# Patient Record
Sex: MALE | Race: WHITE | NOT HISPANIC OR LATINO | Employment: OTHER | URBAN - METROPOLITAN AREA
[De-identification: names, ages, dates, MRNs, and addresses within clinical notes are randomized per-mention and may not be internally consistent; named-entity substitution may affect disease eponyms.]

---

## 2017-11-05 ENCOUNTER — RESOLUTE PROFESSIONAL BILLING HOSPITAL PROF FEE (OUTPATIENT)
Dept: HOSPITALIST | Facility: MEDICAL CENTER | Age: 82
End: 2017-11-05
Payer: COMMERCIAL

## 2017-11-05 ENCOUNTER — APPOINTMENT (OUTPATIENT)
Dept: RADIOLOGY | Facility: MEDICAL CENTER | Age: 82
DRG: 243 | End: 2017-11-05
Attending: EMERGENCY MEDICINE
Payer: COMMERCIAL

## 2017-11-05 ENCOUNTER — HOSPITAL ENCOUNTER (INPATIENT)
Facility: MEDICAL CENTER | Age: 82
LOS: 2 days | DRG: 243 | End: 2017-11-07
Attending: EMERGENCY MEDICINE | Admitting: INTERNAL MEDICINE
Payer: COMMERCIAL

## 2017-11-05 DIAGNOSIS — F03.91 DEMENTIA WITH BEHAVIORAL DISTURBANCE, UNSPECIFIED DEMENTIA TYPE: ICD-10-CM

## 2017-11-05 DIAGNOSIS — R55 SYNCOPE, UNSPECIFIED SYNCOPE TYPE: ICD-10-CM

## 2017-11-05 DIAGNOSIS — I95.9 HYPOTENSION, UNSPECIFIED HYPOTENSION TYPE: ICD-10-CM

## 2017-11-05 DIAGNOSIS — R00.1 BRADYCARDIA: ICD-10-CM

## 2017-11-05 PROBLEM — G30.9 ALZHEIMER'S DISEASE (HCC): Status: ACTIVE | Noted: 2017-11-05

## 2017-11-05 PROBLEM — N17.9 AKI (ACUTE KIDNEY INJURY) (HCC): Status: ACTIVE | Noted: 2017-11-05

## 2017-11-05 PROBLEM — F02.80 ALZHEIMER'S DISEASE (HCC): Status: ACTIVE | Noted: 2017-11-05

## 2017-11-05 PROBLEM — R79.89 ELEVATED BRAIN NATRIURETIC PEPTIDE (BNP) LEVEL: Status: ACTIVE | Noted: 2017-11-05

## 2017-11-05 LAB
ALBUMIN SERPL BCP-MCNC: 3.1 G/DL (ref 3.2–4.9)
ALBUMIN/GLOB SERPL: 1.1 G/DL
ALP SERPL-CCNC: 65 U/L (ref 30–99)
ALT SERPL-CCNC: 11 U/L (ref 2–50)
ANION GAP SERPL CALC-SCNC: 10 MMOL/L (ref 0–11.9)
APTT PPP: 43.7 SEC (ref 24.7–36)
AST SERPL-CCNC: 14 U/L (ref 12–45)
BASOPHILS # BLD AUTO: 0.6 % (ref 0–1.8)
BASOPHILS # BLD: 0.04 K/UL (ref 0–0.12)
BILIRUB SERPL-MCNC: 0.6 MG/DL (ref 0.1–1.5)
BNP SERPL-MCNC: 423 PG/ML (ref 0–100)
BUN SERPL-MCNC: 36 MG/DL (ref 8–22)
CALCIUM SERPL-MCNC: 8.2 MG/DL (ref 8.5–10.5)
CHLORIDE SERPL-SCNC: 104 MMOL/L (ref 96–112)
CO2 SERPL-SCNC: 24 MMOL/L (ref 20–33)
CREAT SERPL-MCNC: 1.61 MG/DL (ref 0.5–1.4)
EKG IMPRESSION: NORMAL
EOSINOPHIL # BLD AUTO: 0.21 K/UL (ref 0–0.51)
EOSINOPHIL NFR BLD: 3 % (ref 0–6.9)
ERYTHROCYTE [DISTWIDTH] IN BLOOD BY AUTOMATED COUNT: 48.1 FL (ref 35.9–50)
GFR SERPL CREATININE-BSD FRML MDRD: 40 ML/MIN/1.73 M 2
GLOBULIN SER CALC-MCNC: 2.8 G/DL (ref 1.9–3.5)
GLUCOSE SERPL-MCNC: 116 MG/DL (ref 65–99)
HCT VFR BLD AUTO: 36 % (ref 42–52)
HGB BLD-MCNC: 12.8 G/DL (ref 14–18)
IMM GRANULOCYTES # BLD AUTO: 0.04 K/UL (ref 0–0.11)
IMM GRANULOCYTES NFR BLD AUTO: 0.6 % (ref 0–0.9)
INR PPP: 2.8 (ref 0.87–1.13)
LYMPHOCYTES # BLD AUTO: 1.66 K/UL (ref 1–4.8)
LYMPHOCYTES NFR BLD: 23.6 % (ref 22–41)
MCH RBC QN AUTO: 32.7 PG (ref 27–33)
MCHC RBC AUTO-ENTMCNC: 35.6 G/DL (ref 33.7–35.3)
MCV RBC AUTO: 91.8 FL (ref 81.4–97.8)
MONOCYTES # BLD AUTO: 0.52 K/UL (ref 0–0.85)
MONOCYTES NFR BLD AUTO: 7.4 % (ref 0–13.4)
NEUTROPHILS # BLD AUTO: 4.56 K/UL (ref 1.82–7.42)
NEUTROPHILS NFR BLD: 64.8 % (ref 44–72)
NRBC # BLD AUTO: 0 K/UL
NRBC BLD AUTO-RTO: 0 /100 WBC
PLATELET # BLD AUTO: 187 K/UL (ref 164–446)
PMV BLD AUTO: 9.6 FL (ref 9–12.9)
POTASSIUM SERPL-SCNC: 3.4 MMOL/L (ref 3.6–5.5)
PROT SERPL-MCNC: 5.9 G/DL (ref 6–8.2)
PROTHROMBIN TIME: 29.2 SEC (ref 12–14.6)
RBC # BLD AUTO: 3.92 M/UL (ref 4.7–6.1)
SODIUM SERPL-SCNC: 138 MMOL/L (ref 135–145)
TROPONIN I SERPL-MCNC: 0.02 NG/ML (ref 0–0.04)
WBC # BLD AUTO: 7 K/UL (ref 4.8–10.8)

## 2017-11-05 PROCEDURE — 85730 THROMBOPLASTIN TIME PARTIAL: CPT

## 2017-11-05 PROCEDURE — 84484 ASSAY OF TROPONIN QUANT: CPT

## 2017-11-05 PROCEDURE — 700105 HCHG RX REV CODE 258: Performed by: INTERNAL MEDICINE

## 2017-11-05 PROCEDURE — 84439 ASSAY OF FREE THYROXINE: CPT

## 2017-11-05 PROCEDURE — A9270 NON-COVERED ITEM OR SERVICE: HCPCS | Performed by: INTERNAL MEDICINE

## 2017-11-05 PROCEDURE — 85025 COMPLETE CBC W/AUTO DIFF WBC: CPT

## 2017-11-05 PROCEDURE — 83880 ASSAY OF NATRIURETIC PEPTIDE: CPT

## 2017-11-05 PROCEDURE — 700102 HCHG RX REV CODE 250 W/ 637 OVERRIDE(OP): Performed by: INTERNAL MEDICINE

## 2017-11-05 PROCEDURE — 80053 COMPREHEN METABOLIC PANEL: CPT

## 2017-11-05 PROCEDURE — 99285 EMERGENCY DEPT VISIT HI MDM: CPT

## 2017-11-05 PROCEDURE — 70450 CT HEAD/BRAIN W/O DYE: CPT

## 2017-11-05 PROCEDURE — 85610 PROTHROMBIN TIME: CPT

## 2017-11-05 PROCEDURE — 770020 HCHG ROOM/CARE - TELE (206)

## 2017-11-05 PROCEDURE — 84443 ASSAY THYROID STIM HORMONE: CPT

## 2017-11-05 PROCEDURE — 71010 DX-CHEST-PORTABLE (1 VIEW): CPT

## 2017-11-05 PROCEDURE — 93005 ELECTROCARDIOGRAM TRACING: CPT | Performed by: EMERGENCY MEDICINE

## 2017-11-05 PROCEDURE — 99223 1ST HOSP IP/OBS HIGH 75: CPT | Performed by: INTERNAL MEDICINE

## 2017-11-05 PROCEDURE — 700105 HCHG RX REV CODE 258: Performed by: EMERGENCY MEDICINE

## 2017-11-05 RX ORDER — FUROSEMIDE 10 MG/ML
20 INJECTION INTRAMUSCULAR; INTRAVENOUS
Status: CANCELLED | OUTPATIENT
Start: 2017-11-05

## 2017-11-05 RX ORDER — POLYETHYLENE GLYCOL 3350 17 G/17G
1 POWDER, FOR SOLUTION ORAL
Status: DISCONTINUED | OUTPATIENT
Start: 2017-11-05 | End: 2017-11-07 | Stop reason: HOSPADM

## 2017-11-05 RX ORDER — ATORVASTATIN CALCIUM 20 MG/1
40 TABLET, FILM COATED ORAL NIGHTLY
COMMUNITY
End: 2017-11-08

## 2017-11-05 RX ORDER — WARFARIN SODIUM 5 MG/1
5 TABLET ORAL DAILY
Status: DISCONTINUED | OUTPATIENT
Start: 2017-11-06 | End: 2017-11-05

## 2017-11-05 RX ORDER — POTASSIUM CHLORIDE 20 MEQ/1
20 TABLET, EXTENDED RELEASE ORAL 2 TIMES DAILY
COMMUNITY

## 2017-11-05 RX ORDER — SODIUM CHLORIDE 9 MG/ML
1000 INJECTION, SOLUTION INTRAVENOUS ONCE
Status: COMPLETED | OUTPATIENT
Start: 2017-11-05 | End: 2017-11-05

## 2017-11-05 RX ORDER — CLONIDINE HYDROCHLORIDE 0.2 MG/1
0.2 TABLET ORAL 2 TIMES DAILY
Status: ON HOLD | COMMUNITY
End: 2017-11-07

## 2017-11-05 RX ORDER — SODIUM CHLORIDE 9 MG/ML
INJECTION, SOLUTION INTRAVENOUS CONTINUOUS
Status: DISCONTINUED | OUTPATIENT
Start: 2017-11-05 | End: 2017-11-07 | Stop reason: HOSPADM

## 2017-11-05 RX ORDER — ACETAMINOPHEN 325 MG/1
650 TABLET ORAL EVERY 6 HOURS PRN
Status: DISCONTINUED | OUTPATIENT
Start: 2017-11-05 | End: 2017-11-07 | Stop reason: HOSPADM

## 2017-11-05 RX ORDER — WARFARIN SODIUM 5 MG/1
5 TABLET ORAL EVERY EVENING
COMMUNITY

## 2017-11-05 RX ORDER — CHLORTHALIDONE 25 MG/1
25 TABLET ORAL DAILY
COMMUNITY

## 2017-11-05 RX ORDER — ONDANSETRON 2 MG/ML
4 INJECTION INTRAMUSCULAR; INTRAVENOUS EVERY 4 HOURS PRN
Status: DISCONTINUED | OUTPATIENT
Start: 2017-11-05 | End: 2017-11-07 | Stop reason: HOSPADM

## 2017-11-05 RX ORDER — WARFARIN SODIUM 2.5 MG/1
2.5 TABLET ORAL
Status: COMPLETED | OUTPATIENT
Start: 2017-11-06 | End: 2017-11-06

## 2017-11-05 RX ORDER — MELOXICAM 7.5 MG/1
7.5 TABLET ORAL EVERY EVENING
COMMUNITY

## 2017-11-05 RX ORDER — BISACODYL 10 MG
10 SUPPOSITORY, RECTAL RECTAL
Status: DISCONTINUED | OUTPATIENT
Start: 2017-11-05 | End: 2017-11-07 | Stop reason: HOSPADM

## 2017-11-05 RX ORDER — ATORVASTATIN CALCIUM 40 MG/1
40 TABLET, FILM COATED ORAL NIGHTLY
Status: DISCONTINUED | OUTPATIENT
Start: 2017-11-05 | End: 2017-11-07 | Stop reason: HOSPADM

## 2017-11-05 RX ORDER — AMOXICILLIN 250 MG
2 CAPSULE ORAL 2 TIMES DAILY
Status: DISCONTINUED | OUTPATIENT
Start: 2017-11-05 | End: 2017-11-07 | Stop reason: HOSPADM

## 2017-11-05 RX ORDER — ONDANSETRON 4 MG/1
4 TABLET, ORALLY DISINTEGRATING ORAL EVERY 4 HOURS PRN
Status: DISCONTINUED | OUTPATIENT
Start: 2017-11-05 | End: 2017-11-07 | Stop reason: HOSPADM

## 2017-11-05 RX ADMIN — SODIUM CHLORIDE 1000 ML: 9 INJECTION, SOLUTION INTRAVENOUS at 21:00

## 2017-11-05 RX ADMIN — ATORVASTATIN CALCIUM 40 MG: 40 TABLET, FILM COATED ORAL at 23:58

## 2017-11-05 RX ADMIN — SODIUM CHLORIDE: 9 INJECTION, SOLUTION INTRAVENOUS at 23:59

## 2017-11-05 ASSESSMENT — ENCOUNTER SYMPTOMS
ABDOMINAL PAIN: 0
SENSORY CHANGE: 0
SHORTNESS OF BREATH: 0
CHILLS: 0
SEIZURES: 0
FOCAL WEAKNESS: 0
FEVER: 0

## 2017-11-05 ASSESSMENT — PAIN SCALES - GENERAL: PAINLEVEL_OUTOF10: 0

## 2017-11-06 PROBLEM — Z86.73 HISTORY OF STROKE: Status: ACTIVE | Noted: 2017-11-06

## 2017-11-06 PROBLEM — Z78.9 ALCOHOL USE: Status: ACTIVE | Noted: 2017-11-06

## 2017-11-06 PROBLEM — I48.20 CHRONIC ATRIAL FIBRILLATION (HCC): Status: ACTIVE | Noted: 2017-11-06

## 2017-11-06 PROBLEM — E87.6 HYPOKALEMIA: Status: ACTIVE | Noted: 2017-11-06

## 2017-11-06 PROBLEM — I10 HTN (HYPERTENSION): Status: ACTIVE | Noted: 2017-11-06

## 2017-11-06 PROBLEM — E78.5 DYSLIPIDEMIA: Status: ACTIVE | Noted: 2017-11-06

## 2017-11-06 PROBLEM — N19 RENAL FAILURE: Status: ACTIVE | Noted: 2017-11-06

## 2017-11-06 LAB
ALBUMIN SERPL BCP-MCNC: 3.1 G/DL (ref 3.2–4.9)
ALBUMIN/GLOB SERPL: 1 G/DL
ALP SERPL-CCNC: 63 U/L (ref 30–99)
ALT SERPL-CCNC: 11 U/L (ref 2–50)
ANION GAP SERPL CALC-SCNC: 10 MMOL/L (ref 0–11.9)
AST SERPL-CCNC: 16 U/L (ref 12–45)
BILIRUB SERPL-MCNC: 0.8 MG/DL (ref 0.1–1.5)
BUN SERPL-MCNC: 34 MG/DL (ref 8–22)
CALCIUM SERPL-MCNC: 9 MG/DL (ref 8.5–10.5)
CHLORIDE SERPL-SCNC: 106 MMOL/L (ref 96–112)
CO2 SERPL-SCNC: 23 MMOL/L (ref 20–33)
CREAT SERPL-MCNC: 1.36 MG/DL (ref 0.5–1.4)
EKG IMPRESSION: NORMAL
ERYTHROCYTE [DISTWIDTH] IN BLOOD BY AUTOMATED COUNT: 47.5 FL (ref 35.9–50)
GFR SERPL CREATININE-BSD FRML MDRD: 49 ML/MIN/1.73 M 2
GLOBULIN SER CALC-MCNC: 3 G/DL (ref 1.9–3.5)
GLUCOSE SERPL-MCNC: 107 MG/DL (ref 65–99)
HCT VFR BLD AUTO: 37.5 % (ref 42–52)
HGB BLD-MCNC: 13.2 G/DL (ref 14–18)
INR PPP: 2.9 (ref 0.87–1.13)
LV EJECT FRACT  99904: 55
LV EJECT FRACT MOD 2C 99903: 75.44
LV EJECT FRACT MOD 4C 99902: 73.16
LV EJECT FRACT MOD BP 99901: 68.26
MCH RBC QN AUTO: 31.9 PG (ref 27–33)
MCHC RBC AUTO-ENTMCNC: 35.2 G/DL (ref 33.7–35.3)
MCV RBC AUTO: 90.6 FL (ref 81.4–97.8)
PLATELET # BLD AUTO: 178 K/UL (ref 164–446)
PMV BLD AUTO: 9.5 FL (ref 9–12.9)
POTASSIUM SERPL-SCNC: 3.6 MMOL/L (ref 3.6–5.5)
PROT SERPL-MCNC: 6.1 G/DL (ref 6–8.2)
PROTHROMBIN TIME: 30 SEC (ref 12–14.6)
RBC # BLD AUTO: 4.14 M/UL (ref 4.7–6.1)
SODIUM SERPL-SCNC: 139 MMOL/L (ref 135–145)
T4 FREE SERPL-MCNC: 1.05 NG/DL (ref 0.53–1.43)
TSH SERPL DL<=0.005 MIU/L-ACNC: 2.67 UIU/ML (ref 0.3–3.7)
WBC # BLD AUTO: 7.2 K/UL (ref 4.8–10.8)

## 2017-11-06 PROCEDURE — 85027 COMPLETE CBC AUTOMATED: CPT

## 2017-11-06 PROCEDURE — 93306 TTE W/DOPPLER COMPLETE: CPT

## 2017-11-06 PROCEDURE — C1892 INTRO/SHEATH,FIXED,PEEL-AWAY: HCPCS

## 2017-11-06 PROCEDURE — 02HK3JZ INSERTION OF PACEMAKER LEAD INTO RIGHT VENTRICLE, PERCUTANEOUS APPROACH: ICD-10-PCS | Performed by: INTERNAL MEDICINE

## 2017-11-06 PROCEDURE — 305387 HCHG SUTURES

## 2017-11-06 PROCEDURE — A9270 NON-COVERED ITEM OR SERVICE: HCPCS

## 2017-11-06 PROCEDURE — 36415 COLL VENOUS BLD VENIPUNCTURE: CPT

## 2017-11-06 PROCEDURE — 99153 MOD SED SAME PHYS/QHP EA: CPT

## 2017-11-06 PROCEDURE — 700102 HCHG RX REV CODE 250 W/ 637 OVERRIDE(OP): Performed by: INTERNAL MEDICINE

## 2017-11-06 PROCEDURE — 93306 TTE W/DOPPLER COMPLETE: CPT | Mod: 26 | Performed by: INTERNAL MEDICINE

## 2017-11-06 PROCEDURE — 304853 HCHG PPM TEST CABLE

## 2017-11-06 PROCEDURE — 93880 EXTRACRANIAL BILAT STUDY: CPT

## 2017-11-06 PROCEDURE — 93880 EXTRACRANIAL BILAT STUDY: CPT | Mod: 26 | Performed by: SURGERY

## 2017-11-06 PROCEDURE — 93010 ELECTROCARDIOGRAM REPORT: CPT | Performed by: INTERNAL MEDICINE

## 2017-11-06 PROCEDURE — A9270 NON-COVERED ITEM OR SERVICE: HCPCS | Performed by: INTERNAL MEDICINE

## 2017-11-06 PROCEDURE — 33207 INSERT HEART PM VENTRICULAR: CPT

## 2017-11-06 PROCEDURE — 700102 HCHG RX REV CODE 250 W/ 637 OVERRIDE(OP)

## 2017-11-06 PROCEDURE — C1894 INTRO/SHEATH, NON-LASER: HCPCS

## 2017-11-06 PROCEDURE — 99152 MOD SED SAME PHYS/QHP 5/>YRS: CPT

## 2017-11-06 PROCEDURE — 0JH604Z INSERTION OF PACEMAKER, SINGLE CHAMBER INTO CHEST SUBCUTANEOUS TISSUE AND FASCIA, OPEN APPROACH: ICD-10-PCS | Performed by: INTERNAL MEDICINE

## 2017-11-06 PROCEDURE — 80053 COMPREHEN METABOLIC PANEL: CPT

## 2017-11-06 PROCEDURE — 93005 ELECTROCARDIOGRAM TRACING: CPT | Performed by: INTERNAL MEDICINE

## 2017-11-06 PROCEDURE — 304952 HCHG R 2 PADS

## 2017-11-06 PROCEDURE — 99233 SBSQ HOSP IP/OBS HIGH 50: CPT | Performed by: FAMILY MEDICINE

## 2017-11-06 PROCEDURE — 700111 HCHG RX REV CODE 636 W/ 250 OVERRIDE (IP)

## 2017-11-06 PROCEDURE — C1898 LEAD, PMKR, OTHER THAN TRANS: HCPCS

## 2017-11-06 PROCEDURE — C1786 PMKR, SINGLE, RATE-RESP: HCPCS

## 2017-11-06 PROCEDURE — 770020 HCHG ROOM/CARE - TELE (206)

## 2017-11-06 PROCEDURE — 85610 PROTHROMBIN TIME: CPT

## 2017-11-06 PROCEDURE — 700101 HCHG RX REV CODE 250

## 2017-11-06 PROCEDURE — 700105 HCHG RX REV CODE 258: Performed by: FAMILY MEDICINE

## 2017-11-06 RX ORDER — LIDOCAINE HYDROCHLORIDE 20 MG/ML
INJECTION, SOLUTION INFILTRATION; PERINEURAL
Status: COMPLETED
Start: 2017-11-06 | End: 2017-11-06

## 2017-11-06 RX ORDER — BUPIVACAINE HYDROCHLORIDE 2.5 MG/ML
INJECTION, SOLUTION EPIDURAL; INFILTRATION; INTRACAUDAL
Status: COMPLETED
Start: 2017-11-06 | End: 2017-11-06

## 2017-11-06 RX ORDER — MIDAZOLAM HYDROCHLORIDE 1 MG/ML
INJECTION INTRAMUSCULAR; INTRAVENOUS
Status: COMPLETED
Start: 2017-11-06 | End: 2017-11-06

## 2017-11-06 RX ORDER — WARFARIN SODIUM 2.5 MG/1
2.5 TABLET ORAL
Status: COMPLETED | OUTPATIENT
Start: 2017-11-06 | End: 2017-11-06

## 2017-11-06 RX ADMIN — WARFARIN SODIUM 2.5 MG: 2.5 TABLET ORAL at 00:05

## 2017-11-06 RX ADMIN — WARFARIN SODIUM 2.5 MG: 2.5 TABLET ORAL at 16:27

## 2017-11-06 RX ADMIN — BUPIVACAINE HYDROCHLORIDE: 2.5 INJECTION, SOLUTION EPIDURAL; INFILTRATION; INTRACAUDAL; PERINEURAL at 15:39

## 2017-11-06 RX ADMIN — STANDARDIZED SENNA CONCENTRATE AND DOCUSATE SODIUM 2 TABLET: 8.6; 5 TABLET, FILM COATED ORAL at 08:49

## 2017-11-06 RX ADMIN — VANCOMYCIN HYDROCHLORIDE 2 G: 1 INJECTION, POWDER, LYOPHILIZED, FOR SOLUTION INTRAVENOUS at 14:30

## 2017-11-06 RX ADMIN — MIDAZOLAM 1 MG: 1 INJECTION INTRAMUSCULAR; INTRAVENOUS at 15:45

## 2017-11-06 RX ADMIN — FENTANYL CITRATE 100 MCG: 50 INJECTION, SOLUTION INTRAMUSCULAR; INTRAVENOUS at 15:43

## 2017-11-06 RX ADMIN — MIDAZOLAM 2 MG: 1 INJECTION INTRAMUSCULAR; INTRAVENOUS at 15:43

## 2017-11-06 RX ADMIN — ATORVASTATIN CALCIUM 40 MG: 40 TABLET, FILM COATED ORAL at 20:47

## 2017-11-06 RX ADMIN — SODIUM CHLORIDE: 9 INJECTION, SOLUTION INTRAVENOUS at 11:15

## 2017-11-06 RX ADMIN — LIDOCAINE HYDROCHLORIDE: 20 INJECTION, SOLUTION INFILTRATION; PERINEURAL at 15:43

## 2017-11-06 ASSESSMENT — COGNITIVE AND FUNCTIONAL STATUS - GENERAL
TURNING FROM BACK TO SIDE WHILE IN FLAT BAD: A LOT
STANDING UP FROM CHAIR USING ARMS: A LOT
SUGGESTED CMS G CODE MODIFIER MOBILITY: CL
DRESSING REGULAR UPPER BODY CLOTHING: A LOT
HELP NEEDED FOR BATHING: A LOT
CLIMB 3 TO 5 STEPS WITH RAILING: TOTAL
MOVING FROM LYING ON BACK TO SITTING ON SIDE OF FLAT BED: A LITTLE
WALKING IN HOSPITAL ROOM: A LOT
MOVING FROM LYING ON BACK TO SITTING ON SIDE OF FLAT BED: A LOT
TOILETING: A LOT
TURNING FROM BACK TO SIDE WHILE IN FLAT BAD: A LITTLE
MOVING TO AND FROM BED TO CHAIR: A LITTLE
PERSONAL GROOMING: A LOT
DAILY ACTIVITIY SCORE: 14
STANDING UP FROM CHAIR USING ARMS: A LOT
MOBILITY SCORE: 14
HELP NEEDED FOR BATHING: A LOT
MOBILITY SCORE: 11
TOILETING: A LITTLE
DRESSING REGULAR LOWER BODY CLOTHING: A LOT
DRESSING REGULAR LOWER BODY CLOTHING: A LITTLE
PERSONAL GROOMING: A LOT
DRESSING REGULAR UPPER BODY CLOTHING: A LOT
SUGGESTED CMS G CODE MODIFIER DAILY ACTIVITY: CK
MOVING TO AND FROM BED TO CHAIR: A LOT
SUGGESTED CMS G CODE MODIFIER DAILY ACTIVITY: CK
WALKING IN HOSPITAL ROOM: A LOT
CLIMB 3 TO 5 STEPS WITH RAILING: TOTAL
DAILY ACTIVITIY SCORE: 16
SUGGESTED CMS G CODE MODIFIER MOBILITY: CL

## 2017-11-06 ASSESSMENT — ENCOUNTER SYMPTOMS
CHILLS: 0
DIZZINESS: 1
NAUSEA: 0
SPUTUM PRODUCTION: 0
LOSS OF CONSCIOUSNESS: 0
STRIDOR: 0
MEMORY LOSS: 1
VOMITING: 0
SHORTNESS OF BREATH: 0
PND: 0
PALPITATIONS: 0
FEVER: 0
WHEEZING: 0
HEMOPTYSIS: 0
SPEECH CHANGE: 0
BLURRED VISION: 0
HEADACHES: 0
ORTHOPNEA: 0
COUGH: 0
SENSORY CHANGE: 0
ABDOMINAL PAIN: 0
SORE THROAT: 0
DIZZINESS: 0
HEARTBURN: 0
DIARRHEA: 0
FOCAL WEAKNESS: 0
BLOOD IN STOOL: 0
WEAKNESS: 0

## 2017-11-06 ASSESSMENT — PAIN SCALES - GENERAL
PAINLEVEL_OUTOF10: 0

## 2017-11-06 ASSESSMENT — LIFESTYLE VARIABLES: DO YOU DRINK ALCOHOL: NO

## 2017-11-06 NOTE — PROGRESS NOTES
Assumed care of patient bedside report received from ER, DIANE Morales. [place]. Pt able to ambulate from gurney to bed with a 1 person hand held assist. VSS. Pt is on room air. Skin check complete. Pt is confused, AO to self and place.  Pt call light within reach and fall precautions in place. Bed locked and in lowest position. Patient instructed to call for assistance before getting out of bed. Bed alarm on.  All questions answered, no other needs at this time. Pt has money clip, keys and credit cards in bedside drawer. Flip phone and glasses are on beside table. Pt's clothes are in pt belonging bag on side table.

## 2017-11-06 NOTE — CARE PLAN
Problem: Safety  Goal: Will remain free from injury  Outcome: PROGRESSING AS EXPECTED  Patient understands purposeful hourly rounding and addressing the 4 P's - pain, potty, position, and placement.    Problem: Infection  Goal: Will remain free from infection  Outcome: PROGRESSING AS EXPECTED  Patient will demonstrate proper hand hygiene.

## 2017-11-06 NOTE — ED NOTES
Elton Olson  89 y.o.  male  Chief Complaint   Patient presents with   • Syncope   • Hypotension     Brought in by shun from [facility]. Patient was at the dining table had a syncopal episode/ multiple times according to staff. No signs of trauma. Hx of afib and on coumadin. Somnolent upon EMS arrival. Alert and oriented upon arrival ED after 600 ml of normal saline. Hypotensive per EMS at 50/p. HR- 35-40

## 2017-11-06 NOTE — CARE PLAN
Problem: Communication  Goal: The ability to communicate needs accurately and effectively will improve  Outcome: PROGRESSING SLOWER THAN EXPECTED  Pt educated on POC and medications.

## 2017-11-06 NOTE — ED NOTES
Hopitalist at bedside. Patient repeatedly asking to speak with his ex wife Sheila Olson in [place].  No listing for Sheila in [facility].  LVM for son, Terrence Olson, patient contact from [place] [facility].

## 2017-11-06 NOTE — ED NOTES
Spoke with Sheila Olson, patients ex wife who communicated with patient via phone for 20 minutes.  Patient given number to contact Sheila in the morning.

## 2017-11-06 NOTE — PROGRESS NOTES
Rima Liu Fall Risk Assessment:     Last Known Fall: No falls  Mobility: Immobilized/requires assist of one person  Medications: No meds  Mental Status/LOC/Awareness: Oriented to person and place  Toileting Needs: Use of assistive device (Bedside commode, bedpan, urinal)  Volume/Electrolyte Status: Use of IV fluids/tube feeds  Communication/Sensory: Visual (Glasses)/hearing deficit  Behavior: Depression/anxiety  Rima Liu Fall Risk Total: 12  Fall Risk Level: MODERATE RISK    Universal Fall Precautions:  call light/belongings in reach, bed in low position and locked, wheelchairs and assistive devices out of sight, siderails up x 2, use non-slip footwear, clutter free and spill free environment, adequate lighting, educate on level of risk, educate to call for assistance    Fall Risk Level Interventions:    TRIAL (TELE 8, NEURO, MED LUH 5) Moderate Fall Risk Interventions  Place yellow fall risk ID band on patient: completed  Provide patient/family education based on risk assessment : completed  Educate patient/family to call staff for assistance when getting out of bed: completed  Place fall precaution signage outside patient door: completed  Utilize bed/chair fall alarm: completed     Patient Specific Interventions:     Medication: review medications with patient and family  Mental Status/LOC/Awareness: reorient patient, reinforce falls education, check on patient hourly, utilize bed/chair fall alarm and reinforce the use of call light  Toileting: provide frquent toileting  Volume/Electrolyte Status: ensure patient remains hydrated, monitor abnormal lab values and ensure IV fluids are appropriate  Communication/Sensory: update plan of care on whiteboard and ensure patient has glasses/contacts and hearing aids/dentures  Behavioral: administer medication as ordered and instruct/reinforce fall program rationale  Mobility: dangle prior to standing, utilize bed/chair fall alarm, ensure bed is locked and in  lowest position, provide appropriate assistive device, instruct patient to exit bed on their strongest side and collaborate with doctor for possible PT/OT consult

## 2017-11-06 NOTE — PROGRESS NOTES
Cardiology Progress Note               Author: Luh RUPERT Ngoermelinda Date & Time created: 11/6/2017  8:14 AM     Interval History:  Patient seen on EPS rounds.  He is irritable & agitated intermittently.  A&O x 4 but doesn't seem to understand what is being told to him.  Wants to go home. Is currently refusing to have PPM placed.  Wants to do it outpatient after he talks to his son.  VSS overnight.  AF on tele, rates 37-59.  Asymptomatic.      Review of Systems   Constitutional: Negative for chills, fever and malaise/fatigue.   HENT: Positive for hearing loss. Negative for congestion and nosebleeds.    Respiratory: Negative for cough, hemoptysis, sputum production, shortness of breath, wheezing and stridor.    Cardiovascular: Negative for chest pain, palpitations, orthopnea, leg swelling and PND.   Gastrointestinal: Negative for abdominal pain, blood in stool, melena, nausea and vomiting.   Genitourinary: Negative for dysuria, frequency, hematuria and urgency.   Neurological: Positive for dizziness. Negative for sensory change, speech change, focal weakness, loss of consciousness, weakness and headaches.        States is a chronic issue.   Psychiatric/Behavioral: Positive for memory loss.     Physical Exam   Constitutional: He is oriented to person, place, and time. He appears well-nourished. No distress.   HENT:   Head: Normocephalic and atraumatic.   Eyes: Pupils are equal, round, and reactive to light. Right eye exhibits discharge. Left eye exhibits discharge. No scleral icterus.   Minimal clear discharge bilaterally   Neck: Normal range of motion. Neck supple. No JVD present.   Cardiovascular: Normal heart sounds, intact distal pulses and normal pulses.  An irregularly irregular rhythm present. Frequent extrasystoles are present. Bradycardia present.  Exam reveals no gallop and no friction rub.    No murmur heard.  Pulmonary/Chest: Effort normal and breath sounds normal. No stridor. No respiratory distress. He has  no wheezes. He has no rales.   Abdominal: Soft. Bowel sounds are normal. He exhibits no distension. There is no tenderness. There is no rebound and no guarding.   Musculoskeletal: Normal range of motion. He exhibits edema.   Mild dependent ankle edema   Neurological: He is alert and oriented to person, place, and time. He has normal strength. GCS eye subscore is 4. GCS verbal subscore is 4. GCS motor subscore is 6.   Skin: Skin is warm and dry. He is not diaphoretic.   Psychiatric: His speech is normal. Thought content normal. His affect is angry and labile. He is agitated. Cognition and memory are impaired. He expresses impulsivity. He exhibits abnormal recent memory and abnormal remote memory.     Hemodynamics:  Temp (24hrs), Av.6 °C (97.8 °F), Min:36.2 °C (97.1 °F), Max:36.7 °C (98.1 °F)  Temperature: 36.6 °C (97.8 °F)  Pulse  Av.6  Min: 37  Max: 59Heart Rate (Monitored): (!) 54  Blood Pressure : 108/62, NIBP: 122/62       Respiratory:  Respiration: 17, Pulse Oximetry: 96 %    Fluids:  Weight: 77.1 kg (170 lb)    GI/Nutrition:  Orders Placed This Encounter   Procedures   • Diet Order     Standing Status:   Standing     Number of Occurrences:   1     Order Specific Question:   Diet:     Answer:   Regular [1]     Lab Results:  Recent Labs      17   WBC  7.0  7.2   RBC  3.92*  4.14*   HEMOGLOBIN  12.8*  13.2*   HEMATOCRIT  36.0*  37.5*   MCV  91.8  90.6   MCH  32.7  31.9   MCHC  35.6*  35.2   RDW  48.1  47.5   PLATELETCT  187  178   MPV  9.6  9.5     Recent Labs      17   SODIUM  138  139   POTASSIUM  3.4*  3.6   CHLORIDE  104  106   CO2  24  23   GLUCOSE  116*  107*   BUN  36*  34*   CREATININE  1.61*  1.36   CALCIUM  8.2*  9.0     Recent Labs      17   APTT  43.7*   --    INR  2.80*  2.90*     Recent Labs      17   BNPBTYPENAT  423*     Recent Labs      17   TROPONINI  0.02   BNPBTYPENAT   423*     Medical Decision Making, by Problem:  Active Hospital Problems    Diagnosis   • *Syncope [R55]   • Bradycardia [R00.1]   • Hypotension [I95.9]   • Renal failure [N19]   • HTN (hypertension) [I10]   • Alcohol use [Z78.9]   • History of stroke [Z86.73]   • Hypokalemia [E87.6]   • Elevated brain natriuretic peptide (BNP) level [R79.89]   • Dyslipidemia [E78.5]   • Alzheimer's disease [G30.9]     Plan:    Refusing PPM at this time.  Discussed with Dr. May, it is reasonable for him to follow-up with us outpatient to have PPM placed at a later time. The patient is refusing for me to set up a f/u appt in our office.  He wishes to call & set it up himself at a later time.  Discussed with bedside RN.  They are planning for him to return to the SNF if discharged by the hospitalist today & will discuss the plan of care with the patient's son.  Echo pending.  On coumadin for AC.      Reviewed items::  Radiology images reviewed, EKG reviewed, Labs reviewed and Medications reviewed  Lindsey catheter::  No Lindsey

## 2017-11-06 NOTE — PROGRESS NOTES
Dr. Del Rio spoke to patient and patient's son and Terrence SANFORD and discussed pacemaker placement option with them and risk of not having one placed.    Patient and son agreed with Dr. Del Rio to have pacemaker placed.    Cardiology, Dr. May and HALINA Arvizu have been paged to notify them that patient is now agreeing for pacemaker placement.    Waiting for cardiology to return my call.

## 2017-11-06 NOTE — PROGRESS NOTES
2 RN skin assessment with DIANE Alvarez:  Left ear has scab, right ear is red and blanching. Right shoulder has scabbing and laceration that is oozing, photo taken. Left forearm has scattered scabbing. Bilateral lower extremities have scattered scabbing. Sacrum is red but blanching, Q2 turns in place. Back has scabbing.

## 2017-11-06 NOTE — ED NOTES
Assessment made. Chart up for MD to see. Hx of dementia. Repetitive. No signs of trauma. Hypotensive.

## 2017-11-06 NOTE — CONSULTS
DATE OF SERVICE:  11/06/2017    REASON FOR CONSULTATION:  This consultation is performed at the request of Dr. Natarajan for atrial fibrillation with bradycardia and near syncope.    HISTORY OF PRESENT ILLNESS:  This is a 89-year-old gentleman with some   dementia and a longstanding history of atrial fibrillation, on warfarin, who   presents with near syncope.  The patient tells me that he usually has about 1   alcoholic beverage per day always in the evening, may have had just a slight   bit more and then was getting up and lightheaded last night.  He did not think   he has had this lightheaded regularly.  He is not on anything to slow him   down for his atrial fibrillation.  He is not sure what has made the   lightheadedness better or worse.  He does note the slightly increased amount   of alcohol yesterday.  When he presented, he had a heart rate in the 30s,   slightly irregular, in atrial fibrillation.  He tells me with his atrial   fibrillation, he has never had any symptoms.  His heart has always been   strong.  He tells me he has always been athletic his whole life and has never   had any heart issues that he knows of.    PAST MEDICAL HISTORY:  Significant for atrial fibrillation, on Coumadin;   dementia.    MEDICATIONS:  He takes Coumadin at home, does not know of any other   medications.  Per his order reconciling, he is on atorvastatin,   chlorthalidone, clonidine, meloxicam, potassium supplementation, and warfarin.    ALLERGIES:  No known drug allergies.    SOCIAL HISTORY:  He is a nonsmoker.  He lives in assisted living.  He has   interesting stories of the past.  He notes that he is a close personal friend   of Edwin Kline.    FAMILY HISTORY:  No premature coronary artery disease.  Largely   noncontributory as the patient is over 89 years old.    REVIEW OF SYSTEMS:  The patient does not recall any symptoms.  It is difficult   to perform the review of symptoms due to his dementia, but he answers no to   all  symptoms other than the lightheadedness.  He does not think he frankly   lost consciousness, but there are some reports in the notes that he did.    PHYSICAL EXAMINATION:  VITAL SIGNS:  He is afebrile.  Vital signs are stable.  His temperature is   97.8, pulse is 59, respiratory rate is 17.  He is satting 96% on room air,   blood pressure is 122/62.  HEENT:  Normocephalic, atraumatic.  Mucous membranes are moist.  Eyes,   extraocular movements intact.  Pupils are equal.  NECK:  No jugular venous distention appreciated.  HEART:  Slightly irregular, slightly bradycardic.  No murmurs, rubs, gallops   appreciated.  LUNGS:  Clear to auscultation bilaterally.  ABDOMEN:  Soft, nontender, nondistended.  EXTREMITIES:  No clubbing, cyanosis or edema.  NEUROLOGIC:  Grossly nonfocal.  PSYCHIATRIC:  He is alert, oriented to person and place.  He is unsure of the   date.  SKIN:  Warm and well perfused.  No significant breakdown of the skin.  There   are some age-related skin changes.    LABORATORY DATA:  Reviewing his data, I have personally looked at his   presenting electrocardiogram, he has atrial fibrillation with a slow   ventricular response.  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  His presenting white blood cell count   was 7.0, hemoglobin 12.8, platelet count 187.  Electrolytes significant for   slightly low potassium at 3.4, his creatinine is slightly elevated at 1.6.    His TSH within normal limits at 2.67.  His INR is 2.8.    ASSESSMENT AND PLAN:  This is a 89-year-old gentleman presenting with near   syncope, with atrial fibrillation with slow ventricular response.  He denies   that he has ever had symptoms before.  I have suggested pacemaker to the   patient.  He thinks that this is reasonable, would like to get home and   possibly come back and do this as an outpatient.  This is not totally   unreasonable if he has access to get home and get back.  He does not    apparently have complete heart block, but I am concerned about recurrent   syncope.  He says he will use his walker and return soon.  If this can be   worked out, that is a reasonable approach.  He understands the risks and   benefits.  We will try and rearrange this for him.       ____________________________________     MD VITALY Hardy / GRISELDA    DD:  11/06/2017 09:28:24  DT:  11/06/2017 09:53:27    D#:  [id number]  Job#:  [id number]

## 2017-11-06 NOTE — ED NOTES
Med rec updated and complete.  Allergies reviewed.  Per MARS from transferring facility.  Pt was unable at this time to participate in   An interview.  Placed call to facility per request of physician to  Verify that MARS were complete and no pages were  Missing.  Facility indicated that all pages and medications were accounted for.

## 2017-11-06 NOTE — DISCHARGE PLANNING
No demographics/family on pt's facesheet. Per RN Myra's note, pt resident at [facility]. SW called [place] ([phone]) and left vm message requesting call back.

## 2017-11-06 NOTE — CATH LAB
Immediate Post-Operative Note      PreOp Diagnosis: a fib slow vr with syncope    PostOp Diagnosis: same    Procedure(s) :  Single chamber ppm    Surgeon(s):  Cortez May M.D.    Type of Anesthesia: Moderate Sedation    Specimen: None    Estimated Blood Loss: 10 cc's    Contrast Media:  0 cc's    Fluoro Time: <10 min        Findings: passive rv, cephalic access  [employer]    Complications: none apparent      Cortez May M.D.  11/6/2017 3:40 PM

## 2017-11-06 NOTE — ED PROVIDER NOTES
ED Provider Note    Scribed for Aylin Tan M.D. by Chester Martinez. 11/5/2017, 8:35 PM.    Means of arrival: Ambulance  History obtained from: Patient  History limited by: The patient's dementia.      CHIEF COMPLAINT  Chief Complaint   Patient presents with   • Syncope   • Hypotension     HPI  Elton Olson is a 89 y.o. Male with a history of hyperlipidemia and Afib who presents to the Emergency Department complaining of multiple episodes of syncope, onset prior to arrival in the ED. This is a patient from [facility]. Per nursing note the patient was at the dining table when he had multiple syncopal episodes. He is on Coumadin at this time for A fib. It is unclear if he had any falls that resulted in trauma. Per EMS the patient's blood pressure was in the 50's systolic and improved to the 80s systolic with 500ccs of fluid. Patient denies associated chest pain, head trauma, hip pain, abdominal pain, numbness, tingling, fever, or chills. Patient has dementia and is unable to provide clear history, he does not recall syncopal events. He denies any pain or dizziness at this time.    HPI limited by the patient's dementia.     REVIEW OF SYSTEMS  Review of Systems   Constitutional: Negative for chills and fever.   HENT:        Negative head trauma.    Respiratory: Negative for shortness of breath.    Cardiovascular: Negative for chest pain.   Gastrointestinal: Negative for abdominal pain.   Musculoskeletal:        Negative hip pain   Neurological: Negative for sensory change, focal weakness and seizures.        Positive syncope.    C.  ROS Limited by the patient's dementia.     PAST MEDICAL HISTORY  Past Medical History:   Diagnosis Date   • Arthritis    • Atrial fibrillation (CMS-HCC)    • Dementia    • Dyslipidemia      SURGICAL HISTORY  patient denies any surgical history    SOCIAL HISTORY  Social History   Substance Use Topics   • Smoking status: Unknown If Ever Smoked   • Smokeless  tobacco: Never Used   • Alcohol use Yes      Comment: Vodka q day      History   Drug use: Unknown     FAMILY HISTORY  No pertinent family history    CURRENT MEDICATIONS  Coumadin    ALLERGIES  No Known Allergies    PHYSICAL EXAM  VITAL SIGNS: BP (!) 82/47   Pulse (!) 43   Temp 36.2 °C (97.1 °F)   Resp 19   Ht 1.829 m (6')   Wt 77.1 kg (170 lb)   SpO2 96%   BMI 23.06 kg/m²   Vitals reviewed by myself.  Physical Exam  Nursing note and vitals reviewed.  Constitutional: Well-developed and well-nourished. No acute distress.   HENT: Head is normocephalic and atraumatic.  Eyes: extra-ocular movements intact  Cardiovascular: bradycardic rate and regular rhythm. No murmur heard. 2+bilatral radial pulses  Pulmonary/Chest: Breath sounds normal. No wheezes or rales.   Abdominal: Soft and non-tender. No distention.    Musculoskeletal: Extremities exhibit normal range of motion without edema or tenderness.   Back: No midline spinal tenderness.   Neurological: Awake and alert to self only. 5/5 strength in BLE and BUE, sensation intact throughout all extremities.   Skin: Skin is warm and dry. No rash. No obvious signs of acute trauma.     DIAGNOSTIC STUDIES /  LABS  Labs Reviewed   CBC WITH DIFFERENTIAL - Abnormal; Notable for the following:        Result Value    RBC 3.92 (*)     Hemoglobin 12.8 (*)     Hematocrit 36.0 (*)     MCHC 35.6 (*)     All other components within normal limits    Narrative:     Indicate which anticoagulants the patient is on:->UNKNOWN   COMP METABOLIC PANEL - Abnormal; Notable for the following:     Potassium 3.4 (*)     Glucose 116 (*)     Bun 36 (*)     Creatinine 1.61 (*)     Calcium 8.2 (*)     Albumin 3.1 (*)     Total Protein 5.9 (*)     All other components within normal limits    Narrative:     Indicate which anticoagulants the patient is on:->UNKNOWN   PROTHROMBIN TIME - Abnormal; Notable for the following:     PT 29.2 (*)     INR 2.80 (*)     All other components within normal limits     Narrative:     Indicate which anticoagulants the patient is on:->UNKNOWN   APTT - Abnormal; Notable for the following:     APTT 43.7 (*)     All other components within normal limits    Narrative:     Indicate which anticoagulants the patient is on:->UNKNOWN   BTYPE NATRIURETIC PEPTIDE - Abnormal; Notable for the following:     B Natriuretic Peptide 423 (*)     All other components within normal limits    Narrative:     Indicate which anticoagulants the patient is on:->UNKNOWN   ESTIMATED GFR - Abnormal; Notable for the following:     GFR If  49 (*)     GFR If Non  40 (*)     All other components within normal limits    Narrative:     Indicate which anticoagulants the patient is on:->UNKNOWN   TROPONIN    Narrative:     Indicate which anticoagulants the patient is on:->UNKNOWN      All labs reviewed by me.    EKG Interpretation:  Interpreted by myself    12 Lead EKG interpreted by me to show:  Time: 2009  A fib with bradycardia.   Rate 39  Axis: Left axis deviation.   QRS Normal at 100.  Mildly prolonged QTc of 497.   Nonspecific T wave inversions in the inferolateral leads.   My impression of this EKG: Does not indicate Acute ischemia at this time.    RADIOLOGY  DX-CHEST-PORTABLE (1 VIEW)   Final Result      1.  No acute cardiac or pulmonary abnormalities are identified.   2.  Atherosclerosis      CT-HEAD W/O   Final Result      1. Cerebral atrophy.   2. White matter lucencies most consistent with small vessel ischemic change versus demyelination or gliosis.   3. Small area of high RIGHT frontal encephalomalacia likely related to remote ischemia   4. Chronic sinus disease   5. Otherwise, Head CT without contrast with no acute findings. No evidence of acute cerebral infarction, hemorrhage or mass lesion.        The radiologist's interpretation of all radiological studies have been reviewed by me.    PROCEDURES  None    REASSESSMENT  8:35 PM Patient seen and examined at bedside.  Ordered for CT head, chest x ray, CBC, CMP, Troponin, INR, APTT, BNP, estimated GFR, and EKG to evaluate. Patient will be treated with IV fluids for hypotension. I discussed with the patient the treatment plan. He understands the plan and is agreeable.     9:30 PM Recheck with the patient. His blood pressure has improved. I discussed with him the laboratory and radiology results. He understands the plan and is agreeable.     9:38 PM I discussed the case with Dr. Natarajan Hospitalist. He is aware of the patient and agrees to admit the patient into his care.    COURSE & MEDICAL DECISION MAKING  Nursing notes, VS, PMSFHx reviewed in chart.    Patient is a 89-year-old male who comes in for syncopal episode. Differential diagnosis includes arrhythmia, symptomatically bradycardia, acute coronary syndrome, electrolyte abnormality, dehydration, concussion, acute intracranial abnormality. Diagnostic workup includes labs, chest x-ray, EKG, and CT of the head.    Patient's initial vitals notable for bradycardia and hypotension with blood pressure in the 80s systolic. He has no focal neurologic deficits on exam. Patient is treated with IV fluids after which systolic blood pressure improves to 110s systolic.We attempted to get a hold of patient's son but there was no answer. It is unclear why patient is so bradycardic, as he is only on clonidine however this could be causing his bradycardia. I had our pharmacy tech call over to this facility and patient is not on any calcium channel blockers or beta blockers. Patient's EKG returns demonstrates atrial fibrillation which she is known to have without any evidence of heart block or ischemia. Chest x-ray returns demonstrates no acute cardiopulmonary processes. The patient's labs are taken and are notable for only mildly elevated BNP of 423. Creatinine is mildly elevated at 1.61. Troponin is negative. CT of the head returns demonstrates no acute intracranial processes. At this time it is  unclear why patient was hypotensive, however he was noted to be fluid responsive, therefore he could've been dehydrated especially with elevated creatinine. However I will admit him for further workup of his bradycardia, hypotension, and syncope. I discussed the case with Dr. Natarajan who has accepted the admission. At time of admission patient is in guarded condition.    DISPOSITION:  Patient will be admitted to Dr. Natarajan Hospitalist in guarded condition.    FINAL IMPRESSION  1. Syncope, unspecified syncope type    2. Bradycardia    3. Hypotension, unspecified hypotension type    4. Dementia with behavioral disturbance, unspecified dementia type          Chester EATON (Scribe), am scribing for, and in the presence of, Aylin Tan M.D.    Electronically signed by: Chester Martinez (Scribe), 11/5/2017    IAylin M.D. personally performed the services described in this documentation, as scribed by Chester Martinez in my presence, and it is both accurate and complete.    The note accurately reflects work and decisions made by me.  Aylin Tan  11/5/2017  10:14 PM

## 2017-11-06 NOTE — PROGRESS NOTES
Inpatient Anticoagulation Service Note    Date: 2017  Reason for Anticoagulation: Atrial Fibrillation        Hemoglobin Value: (!) 13.2  Hematocrit Value: (!) 37.5  Lab Platelet Value: 178  Target INR: 2.0 to 3.0    INR from last 7 days     Date/Time INR Value    17 0236 (!)  2.9    17 (!)  2.8        Dose from last 7 days     Date/Time Dose (mg)    17 0236  2.5    17  2.5        Average Dose (mg):  (Home dose: 5 mg daily)  Significant Interactions: Statin  Bridge Therapy: No     Comments: INR increased slightly overnight.  H/H is stable with no notation of bleeding.  I have ordered another 2.5 mg (half his regular dose) for tonight.    Plan:  INR with morning labs.  Education Material Provided?: No (Chronic warfarin therapy as an outpatient)  Pharmacist suggested discharge dosin.5 mg daily     Aylin Marquez

## 2017-11-06 NOTE — PROGRESS NOTES
Received bedside shift report from night RN, Mandy.  Pt is AOx4.  Discussed POC and goal for the day with patient and he verbalized understanding.  Eduated pt on white board and call light.  Bed locked and in lowest position with bilateral bedside rails up.  Will resume care and continue to monitor.

## 2017-11-06 NOTE — PROGRESS NOTES
Monitor summary:  Afib 51-62,  down to 33 bpm non-sustaining,  1.95 second pause,  R PVC   .-/.10/.-

## 2017-11-06 NOTE — PROGRESS NOTES
[facility] Hospitalist Progress Note    Date of Service: 2017    Chief Complaint  89 y.o. male admitted 2017 with Bradycardia, Hypotension, Syncope, Renal failure.    Interval Problem Update  Bradycardia - remains in the high 30s  Hypotension - BP better  Renal failure - crea better, unknown baseline  HTN - holding meds  Afib - slow ventricular response    Consultants/Specialty  Cardio - May    Disposition  Lengthy discussion with pt and son, pt initially refused pacemaker placement, risks, benefits and complications explained. It appears they have now decided to pursue pacemaker placement. Code status verified/clarified DNR.        Review of Systems   Constitutional: Negative for chills and fever.   HENT: Negative for sore throat.    Eyes: Negative for blurred vision.   Respiratory: Negative for cough, shortness of breath and wheezing.    Cardiovascular: Negative for chest pain and leg swelling.   Gastrointestinal: Negative for abdominal pain, diarrhea, heartburn, nausea and vomiting.   Genitourinary: Negative for dysuria.   Neurological: Negative for dizziness, speech change, focal weakness and headaches.      Physical Exam  Laboratory/Imaging   Hemodynamics  Temp (24hrs), Av.5 °C (97.7 °F), Min:36.2 °C (97.1 °F), Max:36.7 °C (98.1 °F)   Temperature: 36.5 °C (97.7 °F)  Pulse  Av.9  Min: 37  Max: 59 Heart Rate (Monitored): (!) 54  Blood Pressure : 103/59, NIBP: 122/62      Respiratory      Respiration: 16, Pulse Oximetry: 95 %             Fluids    Intake/Output Summary (Last 24 hours) at 17 1354  Last data filed at 17 0900   Gross per 24 hour   Intake               50 ml   Output              475 ml   Net             -425 ml       Nutrition  Orders Placed This Encounter   Procedures   • Diet Order     Standing Status:   Standing     Number of Occurrences:   1     Order Specific Question:   Diet:     Answer:   Regular [1]     Physical Exam   Constitutional: He is oriented to person,  place, and time. He appears well-developed.   HENT:   Head: Normocephalic and atraumatic.   Eyes: Conjunctivae are normal. Pupils are equal, round, and reactive to light.   Neck: No tracheal deviation present. No thyromegaly present.   Cardiovascular: Bradycardia present.    Pulmonary/Chest: Effort normal and breath sounds normal.   Abdominal: Soft. Bowel sounds are normal. He exhibits no distension. There is no tenderness.   Lymphadenopathy:     He has no cervical adenopathy.   Neurological: He is alert and oriented to person, place, and time. No cranial nerve deficit.   MMT 5/5   Skin: Skin is warm and dry.   Nursing note and vitals reviewed.      Recent Labs      11/05/17 2027 11/06/17 0236   WBC  7.0  7.2   RBC  3.92*  4.14*   HEMOGLOBIN  12.8*  13.2*   HEMATOCRIT  36.0*  37.5*   MCV  91.8  90.6   MCH  32.7  31.9   MCHC  35.6*  35.2   RDW  48.1  47.5   PLATELETCT  187  178   MPV  9.6  9.5     Recent Labs      11/05/17 2027 11/06/17 0236   SODIUM  138  139   POTASSIUM  3.4*  3.6   CHLORIDE  104  106   CO2  24  23   GLUCOSE  116*  107*   BUN  36*  34*   CREATININE  1.61*  1.36   CALCIUM  8.2*  9.0     Recent Labs      11/05/17 2027 11/06/17 0236   APTT  43.7*   --    INR  2.80*  2.90*     Recent Labs      11/05/17 2027   BNPBTYPENAT  423*              Assessment/Plan     * Syncope- (present on admission)   Assessment & Plan    secondary to hypotension/bradycardia            Hypotension- (present on admission)   Assessment & Plan    Decrease IVF NS        Bradycardia- (present on admission)   Assessment & Plan    For pacemaker placement        Chronic atrial fibrillation (CMS-HCC)- (present on admission)   Assessment & Plan    Coumadin        Hypokalemia- (present on admission)   Assessment & Plan    Follow bmp        History of stroke- (present on admission)   Assessment & Plan    Coumadin        Alcohol use- (present on admission)   Assessment & Plan    Watch for signs of withdrawal        HTN  (hypertension)- (present on admission)   Assessment & Plan    Hold chlorthalidone, clinidine        Renal failure- (present on admission)   Assessment & Plan    Suspect with chronic component  Check PTH, follow bmp  IVF NS        Elevated brain natriuretic peptide (BNP) level- (present on admission)   Assessment & Plan    Check Echo        Dyslipidemia- (present on admission)   Assessment & Plan    Lipitor        Alzheimer's disease- (present on admission)   Assessment & Plan    Frequent orientation, ambulate, avoid bzd            Reviewed items::  EKG reviewed, Radiology images reviewed, Labs reviewed and Medications reviewed  Lindsey catheter::  No Lindsey  DVT prophylaxis pharmacological::  Warfarin (Coumadin)  Ulcer Prophylaxis::  No

## 2017-11-06 NOTE — PROGRESS NOTES
Inpatient Anticoagulation Service Note    Date: 11/5/2017  Reason for Anticoagulation: Atrial Fibrillation        Hemoglobin Value: (!) 12.8  Hematocrit Value: (!) 36  Lab Platelet Value: 187  Target INR: 2.0 to 3.0    INR from last 7 days     Date/Time INR Value    11/05/17 2027 (!)  2.8        Dose from last 7 days     Date/Time Dose (mg)    11/05/17 2300  2.5        Significant Interactions: Statin  Bridge Therapy: No    Comments:   Patient admitted for syncopal episode.  Pt on coumadin for hx of A fib.  No acute findings on CT of head.  INR is therapeutic upon admit.      Plan:  Give 2.5 mg now, montior INR. Clinical pharmacist to follow and adjust dose per protocol.     Pharmacist suggested discharge dosing: Continue home dose of 5 mg daily.     Radha Pierce [facility]

## 2017-11-06 NOTE — H&P
Hospital Medicine History and Physical      Date of Service  2017    Chief Complaint  Chief Complaint   Patient presents with   • Syncope   • Hypotension       History of Presenting Illness  Wesley is a 89 y.o. male PMH of dementia, atrial fibrillation on warfarin, who presents with syncope episodes. He lives in [place]. Patient is very poor historian due to underlying dementia. Most of the history were taken from chart review and ER staff's. In the ER he was found to have bradycardia, hypotension. He was given IV fluid and his blood pressure is responding to it. But he continued to have severe bradycardia with heart rate in 30-40 range. Patient has history of atrial fibrillation but not on any rate control agent. Cardiology was consulted in ER. He will be admitted to the floor for further management.    Primary Care Physician  No primary care provider on file.      Code Status  Full code    Review of Systems  Review of Systems   Unable to perform ROS: Dementia     Please see HPI, all other systems were reviewed and are negative (AMA/CMS criteria)     Past Medical History  Past Medical History:   Diagnosis Date   • Arthritis    • Atrial fibrillation (CMS-HCC)    • Dementia    • Dyslipidemia        Surgical History  No past surgical history on file.    Medications  No current facility-administered medications on file prior to encounter.      No current outpatient prescriptions on file prior to encounter.     Family History  History reviewed. No pertinent family history.      Social History  Social History   Substance Use Topics   • Smoking status: Unknown If Ever Smoked   • Smokeless tobacco: Never Used   • Alcohol use Yes      Comment: Vodka q day       Allergies  No Known Allergies     Physical Exam  Laboratory   Hemodynamics  Temp (24hrs), Av.2 °C (97.1 °F), Min:36.2 °C (97.1 °F), Max:36.2 °C (97.1 °F)   Temperature: 36.2 °C (97.1 °F)  Pulse  Av.3  Min: 37  Max: 52 Heart Rate (Monitored): (!)  42  Blood Pressure : (!) 82/47, NIBP: 106/55      Respiratory      Respiration: 17, Pulse Oximetry: 95 %             Physical Exam   Constitutional: He is oriented to person, place, and time. No distress.   HENT:   Head: Normocephalic and atraumatic.   Mouth/Throat: Oropharynx is clear and moist.   Eyes: Conjunctivae and EOM are normal. Pupils are equal, round, and reactive to light.   Neck: Normal range of motion. Neck supple. No tracheal deviation present. No thyromegaly present.   Cardiovascular: Normal rate and regular rhythm.    No murmur heard.  Pulmonary/Chest: Effort normal and breath sounds normal. No respiratory distress. He has no wheezes.   Abdominal: Soft. Bowel sounds are normal. He exhibits no distension. There is no tenderness.   Musculoskeletal: He exhibits no edema or tenderness.   Neurological: He is alert and oriented to person, place, and time. No cranial nerve deficit.   Skin: Skin is warm and dry. He is not diaphoretic. No erythema.       Recent Labs      11/05/17 2027   WBC  7.0   RBC  3.92*   HEMOGLOBIN  12.8*   HEMATOCRIT  36.0*   MCV  91.8   MCH  32.7   MCHC  35.6*   RDW  48.1   PLATELETCT  187   MPV  9.6     Recent Labs      11/05/17 2027   SODIUM  138   POTASSIUM  3.4*   CHLORIDE  104   CO2  24   GLUCOSE  116*   BUN  36*   CREATININE  1.61*   CALCIUM  8.2*     Recent Labs      11/05/17 2027   ALTSGPT  11   ASTSGOT  14   ALKPHOSPHAT  65   TBILIRUBIN  0.6   GLUCOSE  116*     Recent Labs      11/05/17 2027   APTT  43.7*   INR  2.80*     Recent Labs      11/05/17 2027   BNPBTYPENAT  423*         Lab Results   Component Value Date    TROPONINI 0.02 11/05/2017       Imaging  DX-CHEST-PORTABLE (1 VIEW)   Final Result      1.  No acute cardiac or pulmonary abnormalities are identified.   2.  Atherosclerosis      CT-HEAD W/O   Final Result      1. Cerebral atrophy.   2. White matter lucencies most consistent with small vessel ischemic change versus demyelination or gliosis.   3. Small  area of high RIGHT frontal encephalomalacia likely related to remote ischemia   4. Chronic sinus disease   5. Otherwise, Head CT without contrast with no acute findings. No evidence of acute cerebral infarction, hemorrhage or mass lesion.        EKG  per my independant read:  QTc: 497, HR: 39, atrial fibrillation, T wave inversion II, III, AVF, V4-V6     Assessment/Plan     I anticipate this patient will require at least two midnights for appropriate medical management, necessitating inpatient admission.    MAINE (acute kidney injury) (CMS-HCC)- (present on admission)   Assessment & Plan    Not sure whether he had CKD  Check UA  Avoid nephrotic drug  On IVF  Follow cmp closely in am        Alzheimer's disease- (present on admission)   Assessment & Plan    stable        Elevated brain natriuretic peptide (BNP) level- (present on admission)   Assessment & Plan    Check echo  No sign of volume overload  Holding lasix due to hypotension        Hypotension- (present on admission)   Assessment & Plan    Related to bradycardia vs dehydration  BP responded to IVF  monitor        Bradycardia- (present on admission)   Assessment & Plan    HR: 30-40 range with hypotension  Concerning for SSS  Cardiology consulted: likely will need pacemaker  NPO  Monitor on tele closely  Check TSH, free t4        Syncope- (present on admission)   Assessment & Plan    Related to hypotension/bradycardia  Check echo  CT head: no acute findings            Critical care time spent 40 minutes without overlap, exclude procedure time.  Prophylaxis:  sc heparin

## 2017-11-07 ENCOUNTER — APPOINTMENT (OUTPATIENT)
Dept: RADIOLOGY | Facility: MEDICAL CENTER | Age: 82
DRG: 243 | End: 2017-11-07
Attending: INTERNAL MEDICINE
Payer: COMMERCIAL

## 2017-11-07 VITALS
SYSTOLIC BLOOD PRESSURE: 151 MMHG | BODY MASS INDEX: 21.62 KG/M2 | DIASTOLIC BLOOD PRESSURE: 92 MMHG | WEIGHT: 159.61 LBS | HEART RATE: 69 BPM | OXYGEN SATURATION: 97 % | RESPIRATION RATE: 18 BRPM | TEMPERATURE: 98.1 F | HEIGHT: 72 IN

## 2017-11-07 LAB
25(OH)D3 SERPL-MCNC: 30 NG/ML (ref 30–100)
ANION GAP SERPL CALC-SCNC: 6 MMOL/L (ref 0–11.9)
BUN SERPL-MCNC: 26 MG/DL (ref 8–22)
CALCIUM SERPL-MCNC: 8.7 MG/DL (ref 8.5–10.5)
CHLORIDE SERPL-SCNC: 106 MMOL/L (ref 96–112)
CO2 SERPL-SCNC: 27 MMOL/L (ref 20–33)
CREAT SERPL-MCNC: 1.05 MG/DL (ref 0.5–1.4)
EKG IMPRESSION: NORMAL
GFR SERPL CREATININE-BSD FRML MDRD: >60 ML/MIN/1.73 M 2
GLUCOSE SERPL-MCNC: 100 MG/DL (ref 65–99)
INR PPP: 3.7 (ref 0.87–1.13)
POTASSIUM SERPL-SCNC: 3.3 MMOL/L (ref 3.6–5.5)
PROTHROMBIN TIME: 36.4 SEC (ref 12–14.6)
PTH-INTACT SERPL-MCNC: 45.7 PG/ML (ref 14–72)
SODIUM SERPL-SCNC: 139 MMOL/L (ref 135–145)
VIT B12 SERPL-MCNC: 432 PG/ML (ref 211–911)

## 2017-11-07 PROCEDURE — 97162 PT EVAL MOD COMPLEX 30 MIN: CPT

## 2017-11-07 PROCEDURE — G8988 SELF CARE GOAL STATUS: HCPCS | Mod: CJ

## 2017-11-07 PROCEDURE — G8978 MOBILITY CURRENT STATUS: HCPCS | Mod: CI

## 2017-11-07 PROCEDURE — 82607 VITAMIN B-12: CPT

## 2017-11-07 PROCEDURE — 99239 HOSP IP/OBS DSCHRG MGMT >30: CPT | Performed by: INTERNAL MEDICINE

## 2017-11-07 PROCEDURE — 36415 COLL VENOUS BLD VENIPUNCTURE: CPT

## 2017-11-07 PROCEDURE — 97166 OT EVAL MOD COMPLEX 45 MIN: CPT

## 2017-11-07 PROCEDURE — 93005 ELECTROCARDIOGRAM TRACING: CPT | Performed by: INTERNAL MEDICINE

## 2017-11-07 PROCEDURE — 93010 ELECTROCARDIOGRAM REPORT: CPT | Performed by: INTERNAL MEDICINE

## 2017-11-07 PROCEDURE — 700105 HCHG RX REV CODE 258: Performed by: FAMILY MEDICINE

## 2017-11-07 PROCEDURE — 82306 VITAMIN D 25 HYDROXY: CPT

## 2017-11-07 PROCEDURE — 83970 ASSAY OF PARATHORMONE: CPT

## 2017-11-07 PROCEDURE — G8987 SELF CARE CURRENT STATUS: HCPCS | Mod: CK

## 2017-11-07 PROCEDURE — 85610 PROTHROMBIN TIME: CPT

## 2017-11-07 PROCEDURE — 80048 BASIC METABOLIC PNL TOTAL CA: CPT

## 2017-11-07 PROCEDURE — 71010 DX-CHEST-PORTABLE (1 VIEW): CPT

## 2017-11-07 PROCEDURE — G8980 MOBILITY D/C STATUS: HCPCS | Mod: CI

## 2017-11-07 PROCEDURE — G8979 MOBILITY GOAL STATUS: HCPCS | Mod: CI

## 2017-11-07 RX ADMIN — SODIUM CHLORIDE: 9 INJECTION, SOLUTION INTRAVENOUS at 05:13

## 2017-11-07 ASSESSMENT — COGNITIVE AND FUNCTIONAL STATUS - GENERAL
MOVING FROM LYING ON BACK TO SITTING ON SIDE OF FLAT BED: A LITTLE
MOBILITY SCORE: 21
TURNING FROM BACK TO SIDE WHILE IN FLAT BAD: A LITTLE
MOVING TO AND FROM BED TO CHAIR: A LITTLE
DRESSING REGULAR UPPER BODY CLOTHING: A LITTLE
PERSONAL GROOMING: A LOT
SUGGESTED CMS G CODE MODIFIER MOBILITY: CL
TOILETING: A LITTLE
DRESSING REGULAR LOWER BODY CLOTHING: A LITTLE
PERSONAL GROOMING: A LITTLE
DAILY ACTIVITIY SCORE: 16
CLIMB 3 TO 5 STEPS WITH RAILING: TOTAL
WALKING IN HOSPITAL ROOM: A LITTLE
STANDING UP FROM CHAIR USING ARMS: A LITTLE
SUGGESTED CMS G CODE MODIFIER MOBILITY: CJ
SUGGESTED CMS G CODE MODIFIER DAILY ACTIVITY: CK
WALKING IN HOSPITAL ROOM: A LOT
HELP NEEDED FOR BATHING: A LOT
DRESSING REGULAR LOWER BODY CLOTHING: A LOT
HELP NEEDED FOR BATHING: A LOT
DAILY ACTIVITIY SCORE: 16
SUGGESTED CMS G CODE MODIFIER DAILY ACTIVITY: CK
DRESSING REGULAR UPPER BODY CLOTHING: A LOT
CLIMB 3 TO 5 STEPS WITH RAILING: A LITTLE
STANDING UP FROM CHAIR USING ARMS: A LOT
TOILETING: A LOT
MOBILITY SCORE: 14

## 2017-11-07 ASSESSMENT — ENCOUNTER SYMPTOMS
SEIZURES: 0
MUSCULOSKELETAL NEGATIVE: 1
PSYCHIATRIC NEGATIVE: 1
FEVER: 0
COUGH: 0
ABDOMINAL PAIN: 0
VOMITING: 0
FOCAL WEAKNESS: 0
DIZZINESS: 1
HEARTBURN: 0
SPEECH CHANGE: 0
CHILLS: 0
WHEEZING: 0
SPUTUM PRODUCTION: 0
BLURRED VISION: 0
HEADACHES: 0
LOSS OF CONSCIOUSNESS: 0
DOUBLE VISION: 0
SORE THROAT: 0
PND: 0
CLAUDICATION: 0
SHORTNESS OF BREATH: 0
PALPITATIONS: 0
WEIGHT LOSS: 0
FLANK PAIN: 0
NAUSEA: 0
ORTHOPNEA: 0

## 2017-11-07 ASSESSMENT — PAIN SCALES - GENERAL: PAINLEVEL_OUTOF10: 0

## 2017-11-07 ASSESSMENT — CHA2DS2 SCORE
AGE 75 OR GREATER: YES
CHF OR LEFT VENTRICULAR DYSFUNCTION: YES
PRIOR STROKE OR TIA OR THROMBOEMBOLISM: NO
HYPERTENSION: NO
DIABETES: NO
VASCULAR DISEASE: NO
CHA2DS2 VASC SCORE: 3
SEX: MALE
AGE 65 TO 74: NO

## 2017-11-07 ASSESSMENT — GAIT ASSESSMENTS
DEVIATION: SHUFFLED GAIT
ASSISTIVE DEVICE: FRONT WHEEL WALKER
DISTANCE (FEET): 250
GAIT LEVEL OF ASSIST: STAND BY ASSIST

## 2017-11-07 ASSESSMENT — ACTIVITIES OF DAILY LIVING (ADL): TOILETING: INDEPENDENT

## 2017-11-07 ASSESSMENT — LIFESTYLE VARIABLES
DO YOU DRINK ALCOHOL: NO
EVER_SMOKED: NEVER

## 2017-11-07 NOTE — THERAPY
"Physical Therapy Evaluation completed.   Bed Mobility:  Supine to Sit: Stand by Assist  Transfers: Sit to Stand: Stand by Assist  Gait: Level Of Assist: Stand by Assist with Front-Wheel Walker       Plan of Care: Patient with no further skilled PT needs in the acute care setting at this time  Discharge Recommendations: Equipment: No Equipment Needed. Post-acute therapy Discharge to home with outpatient or home health for additional skilled therapy services.    Pt presents without gross mobility deficit following pacemaker placement. Pt resides at [facility] where he is able to receive support should he need it. Son present who reports he will set up assisted bathing with pt upon return. Pt has 4WW and today demo'd gait at [facility] with FWW but with light use. Pt will benefit from further skilled PT services upon return to [facility] to address balance and gait (pt reporting he does not want to use 4WW all the time). Pt does not require further acute skilled PT intervention.     See \"Rehab Therapy-Acute\" Patient Summary Report for complete documentation.     "

## 2017-11-07 NOTE — PROGRESS NOTES
Received report from day shift RN. Twelve hour chart check completed. Patient assessed. Patient A and 0 X 3; disoriented to location. Patient forgetful.  Bed alarm is on.   Patient states that pain is a 0 out of 10.  Patient educated on plan of care for the evening including medications MAR, monitoring vital signs, safety, and rest. Patient educated to call for assistance. Patient verbalizes understanding.

## 2017-11-07 NOTE — CARE PLAN
Problem: Communication  Goal: The ability to communicate needs accurately and effectively will improve    Intervention: Educate patient and significant other/support system about the plan of care, procedures, treatments, medications and allow for questions  Bedside report completed. Patient educated on plan of care, call light,  and communication board. Patient verbalizes understanding.         Problem: Safety  Goal: Will remain free from injury  Outcome: PROGRESSING AS EXPECTED  Bed in low position.  Treaded socks on patient.  Call light within reach.  Bedrails closest to bathroom down.  Patient educated to call for assistance.

## 2017-11-07 NOTE — PROGRESS NOTES
Cardiology Progress Note               Author: Dayana Dinh Date & Time created: 11/7/2017  8:33 AM     Interval History:  Patient seen on EPS rounds.  Seen yesterday by Dr May with the following consultative report:   REASON FOR CONSULTATION:  This consultation is performed at the request of Dr. Natarajan for atrial fibrillation with bradycardia and near syncope.   HISTORY OF PRESENT ILLNESS:  This is a 89-year-old gentleman with some dementia and a longstanding history of atrial fibrillation, on warfarin, who presents with near syncope.  The patient tells me that he usually has about 1 alcoholic beverage per day always in the evening, may have had just a slight bit more and then was getting up and lightheaded last night.  He did not think he has had this lightheaded regularly.  He is not on anything to slow him down for his atrial fibrillation.  He is not sure what has made the lightheadedness better or worse.  He does note the slightly increased amount of alcohol yesterday.  When he presented, he had a heart rate in the 30s,   slightly irregular, in atrial fibrillation.  He tells me with his atrial   fibrillation, he has never had any symptoms.  His heart has always been strong.  He tells me he has always been athletic his whole life and has never had any heart issues that he knows of.     ASSESSMENT AND PLAN:  This is a 89-year-old gentleman presenting with near syncope, with atrial fibrillation with slow ventricular response.  He denies that he has ever had symptoms before.  I have suggested pacemaker to the   patient.  He thinks that this is reasonable, would like to get home and possibly come back and do this as an outpatient.  This is not totally unreasonable if he has access to get home and get back.  He does not apparently have complete heart block, but I am concerned about recurrent syncope.  He says he will use his walker and return soon.  If this can be worked out, that is a reasonable approach.  He  understands the risks and benefits.  We will try and rearrange this for him.    11/6/17  Procedure(s) :  Single chamber ppm   Surgeon(s):  Cortez May M.D.   Type of Anesthesia: Moderate Sedation   Specimen: None   Estimated Blood Loss: 10 cc's   Contrast Media:  0 cc's   Fluoro Time: <10 min     CXR no late PTx.    Review of Systems   Constitutional: Negative for chills, fever, malaise/fatigue and weight loss.   HENT: Negative for congestion and sore throat.    Eyes: Negative for blurred vision and double vision.   Respiratory: Negative for cough, sputum production, shortness of breath and wheezing.    Cardiovascular: Negative for chest pain, palpitations, orthopnea, claudication, leg swelling and PND.   Gastrointestinal: Negative for abdominal pain, heartburn, nausea and vomiting.   Genitourinary: Negative for dysuria, flank pain and frequency.   Musculoskeletal: Negative.    Skin: Negative.    Neurological: Positive for dizziness. Negative for speech change, focal weakness, seizures, loss of consciousness and headaches.   Endo/Heme/Allergies: Negative.    Psychiatric/Behavioral: Negative.        Physical Exam   Constitutional: He is oriented to person, place, and time. He appears well-developed and well-nourished.   HENT:   Head: Normocephalic and atraumatic.   Eyes: Pupils are equal, round, and reactive to light.   Neck: Normal range of motion. Neck supple. No thyromegaly present.   Cardiovascular: Normal rate.  An irregularly irregular rhythm present. Exam reveals no gallop and no friction rub.    No murmur heard.  Pulmonary/Chest: Effort normal and breath sounds normal. No respiratory distress. He has no wheezes. He has no rales.   PPM site uncomplicated new tegaderm dressing applied.   Abdominal: Soft. Bowel sounds are normal. He exhibits no distension. There is no tenderness. There is no guarding.   Musculoskeletal: Normal range of motion. He exhibits no edema.   Neurological: He is alert and oriented to  person, place, and time.   Skin: Skin is warm and dry.   Psychiatric: He has a normal mood and affect.       Hemodynamics:  Temp (24hrs), Av.4 °C (97.6 °F), Min:36.1 °C (97 °F), Max:36.7 °C (98 °F)  Temperature: 36.7 °C (98 °F)  Pulse  Av.6  Min: 37  Max: 69  Blood Pressure : 138/79     Respiratory:    Respiration: 17, Pulse Oximetry: 95 %        RUL Breath Sounds: Clear, RML Breath Sounds: Diminished, RLL Breath Sounds: Diminished, LORETTA Breath Sounds: Clear, LLL Breath Sounds: Diminished  Fluids:     Weight: 72.4 kg (159 lb 9.8 oz)  GI/Nutrition:  Orders Placed This Encounter   Procedures   • Diet Order     Standing Status:   Standing     Number of Occurrences:   1     Order Specific Question:   Diet:     Answer:   Cardiac [6]     Lab Results:  Recent Labs      17   WBC  7.0  7.2   RBC  3.92*  4.14*   HEMOGLOBIN  12.8*  13.2*   HEMATOCRIT  36.0*  37.5*   MCV  91.8  90.6   MCH  32.7  31.9   MCHC  35.6*  35.2   RDW  48.1  47.5   PLATELETCT  187  178   MPV  9.6  9.5     Recent Labs      17   SODIUM  138  139  139   POTASSIUM  3.4*  3.6  3.3*   CHLORIDE  104  106  106   CO2  24  23  27   GLUCOSE  116*  107*  100*   BUN  36*  34*  26*   CREATININE  1.61*  1.36  1.05   CALCIUM  8.2*  9.0  8.7     Recent Labs      17   APTT  43.7*   --    --    INR  2.80*  2.90*  3.70*     Recent Labs      17   BNPBTYPENAT  423*     Recent Labs      17   TROPONINI  0.02   BNPBTYPENAT  423*             Medical Decision Making, by Problem:  Active Hospital Problems    Diagnosis   • *Syncope [R55]   • Bradycardia [R00.1]   • Hypotension [I95.9]   • Renal failure [N19]   • HTN (hypertension) [I10]   • Alcohol use [Z78.9]   • History of stroke [Z86.73]   • Hypokalemia [E87.6]   • Chronic atrial fibrillation (CMS-HCC) [I48.2]   • Elevated brain natriuretic peptide (BNP) level [R79.89]   •  Dyslipidemia [E78.5]   • Alzheimer's disease [G30.9]       Plan:  Dressing changed.  Shoulder immobilizer ordered.  Dizzy this AM.  Etiology unclear.  [facility] a [facility]. Appears mildly confused.  From EPS standpoint his device demonstrates appropriate function, no late Ptx.  Appt in one week in our office.   Arm restrictions reviewed.  EPs will sign off thank you for this consultation.  [employer]asures

## 2017-11-07 NOTE — PROGRESS NOTES
Inpatient Anticoagulation Service Note    Date: 11/7/2017  Reason for Anticoagulation: Atrial Fibrillation   FPB1TQ3 VASc Score: 3    Hemoglobin Value: (!) 13.2  Hematocrit Value: (!) 37.5  Lab Platelet Value: 178  Target INR: 2.0 to 3.0    INR from last 7 days     Date/Time INR Value    11/07/17 0422 (!)  3.7    11/06/17 0236 (!)  2.9    11/05/17 2027 (!)  2.8        Dose from last 7 days     Date/Time Dose (mg)    11/07/17 0800  0    11/06/17 0236  2.5    11/05/17 2300  2.5        Average Dose (mg):  (Home dose: 5 mg daily)  Significant Interactions: Statin  Bridge Therapy: No     Reversal Agent Administered: Not Applicable    Comments: Patient's INR increased significantly overnight on conservative warfarin dosing and is supratherapeutic today. Patient underwent pacemaker placement yesterday, has resumed an oral diet and no new significant warfarin drug interactions are present. No new CBC for review, however, there are no documented s/s of bleeding present. Will hold warfarin this evening and repeat coags in AM to guide restart of therapy.    Plan:  HOLD warfarin today. INR in AM.    Education Material Provided?: No (Chronic warfarin therapy as an outpatient)    Pharmacist suggested discharge dosing: Possibly resume prior home warfarin regimen of 5 mg PO daily pending INR <3.     Pharmacy will continue to follow.     Karma Solis, PharmD

## 2017-11-07 NOTE — PROGRESS NOTES
Received bedside shift report from night RN, Jazz.  Pt is AOx3, confused to time.  Discussed POC and goal for the day with patient and he verbalized understanding.  Eduated pt on white board and call light.  Bed locked and in lowest position with bilateral bedside rails up.  Will resume care and continue to monitor.

## 2017-11-07 NOTE — CARE PLAN
Problem: Infection  Goal: Will remain free from infection  Outcome: PROGRESSING AS EXPECTED  Patient will demonstrate proper hand hygiene.    Problem: Knowledge Deficit  Goal: Knowledge of disease process/condition, treatment plan, diagnostic tests, and medications will improve  Outcome: PROGRESSING AS EXPECTED  Patient understands restrictions of left arm after getting pacemaker placed.

## 2017-11-07 NOTE — PROGRESS NOTES
Shoulder immobilizer was delivered and fitted to patient LUE.  If any further assistance needed, please call extension [phone] or place order for Ortho Technician assistance as a communication order in [facility].

## 2017-11-07 NOTE — PROGRESS NOTES
Rima Liu Fall Risk Assessment:     Last Known Fall: No falls  Mobility: Immobilized/requires assist of one person  Medications: No meds  Mental Status/LOC/Awareness: Memory loss/confusion and requires reorienting  Toileting Needs: Use of assistive device (Bedside commode, bedpan, urinal)  Volume/Electrolyte Status: Use of IV fluids/tube feeds  Communication/Sensory: Visual (Glasses)/hearing deficit  Behavior: Appropriate behavior  Rima Liu Fall Risk Total: 13  Fall Risk Level: MODERATE RISK    Universal Fall Precautions:  call light/belongings in reach, bed in low position and locked, wheelchairs and assistive devices out of sight, siderails up x 2, adequate lighting, clutter free and spill free environment, educate on level of risk, educate to call for assistance    Fall Risk Level Interventions:    TRIAL (TELE 8, NEURO, MED LUH 5) Moderate Fall Risk Interventions  Place yellow fall risk ID band on patient: completed  Provide patient/family education based on risk assessment : completed  Educate patient/family to call staff for assistance when getting out of bed: completed  Place fall precaution signage outside patient door: completed  Utilize bed/chair fall alarm: completed     Patient Specific Interventions:     Medication: review medications with patient and family  Mental Status/LOC/Awareness: check on patient hourly, utilize bed/chair fall alarm and reinforce the use of call light  Toileting: provide frquent toileting  Volume/Electrolyte Status: ensure patient remains hydrated  Communication/Sensory: update plan of care on whiteboard  Behavioral: engage patient in daily activities  Mobility: utilize bed/chair fall alarm and ensure bed is locked and in lowest position

## 2017-11-07 NOTE — PROCEDURES
DATE OF SERVICE:  11/06/2017    PROCEDURE:  Single-chamber pacemaker implantation.    INDICATION FOR PROCEDURE:  Atrial fibrillation with very slow ventricular   response and syncope/presyncope.    PROCEDURE IN DETAIL:  After obtaining informed consent, patient was brought to   the cardiac catheterization laboratory in the fasting state.  He was prepped   and draped in the usual sterile fashion.  He received IV antibiotic prior to   incision.  He received conscious sedation with midazolam and fentanyl.  We   began by anesthetizing the left deltopectoral area with a mix of lidocaine and   bupivacaine and made a 4 cm incision, dissected down to the prepectoral   fascia.  On the prepectoral fascia, I was able to isolate the cephalic vein   and get a wire down to the IVC.  This wire was used for a sheath to deploy a   right ventricular lead.  Unfortunately, this wire went down, but the lead did   not, so I had to exchange for a long sheath and then I placed a right   ventricular lead made by [employer] serial #[id number].  This is a passive   lead that was placed near the RV apex where there were R waves of 10.7, a   threshold of 0.7 volts at 0.4 milliseconds, and a pacing impedance of 607 ohms   with good sensing and pacing parameters on this lead.  It was sutured down to   the prepectoral fascia.  There was some bleeding around the access site, so I   placed a figure-of-eight suture to help with the bleeding and then a pocket   was made and rinsed with copious amount of antibiotic solution.  The lead was   connected to a [employer] MRI conditional pacemaker generator, serial   #[id number].  This generator was then placed in the pocket with the leads   underneath.  The pocket was closed in 3 layers.  Device is programmed VVIR   .    CONCLUSION:  Successful implantation of a single-chamber pacemaker system.    DISCUSSION:  Patient did very well with the pacemaker.  I am hopeful this will   prevent recurrent  syncope and falls in this 89-year-old gentleman.       ____________________________________     MD VITALY Hardy / GRISELDA    DD:  11/07/2017 08:14:22  DT:  11/07/2017 09:06:56    D#:  [id number]  Job#:  [id number]

## 2017-11-08 ENCOUNTER — APPOINTMENT (OUTPATIENT)
Dept: RADIOLOGY | Facility: MEDICAL CENTER | Age: 82
DRG: 640 | End: 2017-11-08
Attending: EMERGENCY MEDICINE
Payer: COMMERCIAL

## 2017-11-08 ENCOUNTER — HOSPITAL ENCOUNTER (INPATIENT)
Facility: MEDICAL CENTER | Age: 82
LOS: 2 days | DRG: 640 | End: 2017-11-10
Attending: EMERGENCY MEDICINE | Admitting: INTERNAL MEDICINE
Payer: COMMERCIAL

## 2017-11-08 ENCOUNTER — RESOLUTE PROFESSIONAL BILLING HOSPITAL PROF FEE (OUTPATIENT)
Dept: HOSPITALIST | Facility: MEDICAL CENTER | Age: 82
End: 2017-11-08
Payer: COMMERCIAL

## 2017-11-08 DIAGNOSIS — I95.9 HYPOTENSION, UNSPECIFIED HYPOTENSION TYPE: ICD-10-CM

## 2017-11-08 DIAGNOSIS — R53.83 LETHARGY: ICD-10-CM

## 2017-11-08 DIAGNOSIS — R00.1 BRADYCARDIA: ICD-10-CM

## 2017-11-08 PROBLEM — Z79.01 CHRONIC ANTICOAGULATION: Status: ACTIVE | Noted: 2017-11-08

## 2017-11-08 PROBLEM — R79.89 ELEVATED TROPONIN: Status: ACTIVE | Noted: 2017-11-08

## 2017-11-08 LAB
ALBUMIN SERPL BCP-MCNC: 3.2 G/DL (ref 3.2–4.9)
ALBUMIN/GLOB SERPL: 1.1 G/DL
ALP SERPL-CCNC: 62 U/L (ref 30–99)
ALT SERPL-CCNC: 13 U/L (ref 2–50)
ANION GAP SERPL CALC-SCNC: 8 MMOL/L (ref 0–11.9)
APPEARANCE UR: CLEAR
APTT PPP: 49.7 SEC (ref 24.7–36)
AST SERPL-CCNC: 20 U/L (ref 12–45)
BACTERIA #/AREA URNS HPF: NEGATIVE /HPF
BASOPHILS # BLD AUTO: 0.2 % (ref 0–1.8)
BASOPHILS # BLD: 0.02 K/UL (ref 0–0.12)
BILIRUB SERPL-MCNC: 1.9 MG/DL (ref 0.1–1.5)
BILIRUB UR QL STRIP.AUTO: NEGATIVE
BNP SERPL-MCNC: 540 PG/ML (ref 0–100)
BUN SERPL-MCNC: 25 MG/DL (ref 8–22)
CALCIUM SERPL-MCNC: 8.6 MG/DL (ref 8.5–10.5)
CHLORIDE SERPL-SCNC: 105 MMOL/L (ref 96–112)
CO2 SERPL-SCNC: 26 MMOL/L (ref 20–33)
COLOR UR: YELLOW
CREAT SERPL-MCNC: 1.27 MG/DL (ref 0.5–1.4)
EKG IMPRESSION: NORMAL
EOSINOPHIL # BLD AUTO: 0.02 K/UL (ref 0–0.51)
EOSINOPHIL NFR BLD: 0.2 % (ref 0–6.9)
EPI CELLS #/AREA URNS HPF: NEGATIVE /HPF
ERYTHROCYTE [DISTWIDTH] IN BLOOD BY AUTOMATED COUNT: 46.4 FL (ref 35.9–50)
GFR SERPL CREATININE-BSD FRML MDRD: 53 ML/MIN/1.73 M 2
GLOBULIN SER CALC-MCNC: 2.9 G/DL (ref 1.9–3.5)
GLUCOSE SERPL-MCNC: 134 MG/DL (ref 65–99)
GLUCOSE UR STRIP.AUTO-MCNC: NEGATIVE MG/DL
HCT VFR BLD AUTO: 34.8 % (ref 42–52)
HGB BLD-MCNC: 12.6 G/DL (ref 14–18)
HYALINE CASTS #/AREA URNS LPF: ABNORMAL /LPF
IMM GRANULOCYTES # BLD AUTO: 0.04 K/UL (ref 0–0.11)
IMM GRANULOCYTES NFR BLD AUTO: 0.5 % (ref 0–0.9)
INR PPP: 3.63 (ref 0.87–1.13)
KETONES UR STRIP.AUTO-MCNC: NEGATIVE MG/DL
LACTATE BLD-SCNC: 2 MMOL/L (ref 0.5–2)
LEUKOCYTE ESTERASE UR QL STRIP.AUTO: NEGATIVE
LIPASE SERPL-CCNC: 14 U/L (ref 11–82)
LYMPHOCYTES # BLD AUTO: 0.85 K/UL (ref 1–4.8)
LYMPHOCYTES NFR BLD: 9.7 % (ref 22–41)
MCH RBC QN AUTO: 32.8 PG (ref 27–33)
MCHC RBC AUTO-ENTMCNC: 36.2 G/DL (ref 33.7–35.3)
MCV RBC AUTO: 90.6 FL (ref 81.4–97.8)
MICRO URNS: ABNORMAL
MONOCYTES # BLD AUTO: 0.46 K/UL (ref 0–0.85)
MONOCYTES NFR BLD AUTO: 5.2 % (ref 0–13.4)
NEUTROPHILS # BLD AUTO: 7.41 K/UL (ref 1.82–7.42)
NEUTROPHILS NFR BLD: 84.2 % (ref 44–72)
NITRITE UR QL STRIP.AUTO: NEGATIVE
NRBC # BLD AUTO: 0 K/UL
NRBC BLD AUTO-RTO: 0 /100 WBC
PH UR STRIP.AUTO: 7 [PH]
PLATELET # BLD AUTO: 180 K/UL (ref 164–446)
PMV BLD AUTO: 9.5 FL (ref 9–12.9)
POTASSIUM SERPL-SCNC: 3.6 MMOL/L (ref 3.6–5.5)
PROT SERPL-MCNC: 6.1 G/DL (ref 6–8.2)
PROT UR QL STRIP: 30 MG/DL
PROTHROMBIN TIME: 35.9 SEC (ref 12–14.6)
RBC # BLD AUTO: 3.84 M/UL (ref 4.7–6.1)
RBC # URNS HPF: ABNORMAL /HPF
RBC UR QL AUTO: NEGATIVE
SODIUM SERPL-SCNC: 139 MMOL/L (ref 135–145)
SP GR UR STRIP.AUTO: 1.01
TROPONIN I SERPL-MCNC: 0.06 NG/ML (ref 0–0.04)
UROBILINOGEN UR STRIP.AUTO-MCNC: 1 MG/DL
WBC # BLD AUTO: 8.8 K/UL (ref 4.8–10.8)
WBC #/AREA URNS HPF: ABNORMAL /HPF

## 2017-11-08 PROCEDURE — 85730 THROMBOPLASTIN TIME PARTIAL: CPT

## 2017-11-08 PROCEDURE — 99285 EMERGENCY DEPT VISIT HI MDM: CPT

## 2017-11-08 PROCEDURE — 700102 HCHG RX REV CODE 250 W/ 637 OVERRIDE(OP): Performed by: INTERNAL MEDICINE

## 2017-11-08 PROCEDURE — 87040 BLOOD CULTURE FOR BACTERIA: CPT

## 2017-11-08 PROCEDURE — A9270 NON-COVERED ITEM OR SERVICE: HCPCS | Performed by: INTERNAL MEDICINE

## 2017-11-08 PROCEDURE — 85025 COMPLETE CBC W/AUTO DIFF WBC: CPT

## 2017-11-08 PROCEDURE — 83690 ASSAY OF LIPASE: CPT

## 2017-11-08 PROCEDURE — 87086 URINE CULTURE/COLONY COUNT: CPT

## 2017-11-08 PROCEDURE — 700105 HCHG RX REV CODE 258: Performed by: INTERNAL MEDICINE

## 2017-11-08 PROCEDURE — 80053 COMPREHEN METABOLIC PANEL: CPT

## 2017-11-08 PROCEDURE — 99223 1ST HOSP IP/OBS HIGH 75: CPT | Performed by: INTERNAL MEDICINE

## 2017-11-08 PROCEDURE — 700105 HCHG RX REV CODE 258: Performed by: EMERGENCY MEDICINE

## 2017-11-08 PROCEDURE — 93005 ELECTROCARDIOGRAM TRACING: CPT | Performed by: EMERGENCY MEDICINE

## 2017-11-08 PROCEDURE — 83605 ASSAY OF LACTIC ACID: CPT

## 2017-11-08 PROCEDURE — 700102 HCHG RX REV CODE 250 W/ 637 OVERRIDE(OP): Performed by: PHARMACIST

## 2017-11-08 PROCEDURE — 770020 HCHG ROOM/CARE - TELE (206)

## 2017-11-08 PROCEDURE — A9270 NON-COVERED ITEM OR SERVICE: HCPCS | Performed by: PHARMACIST

## 2017-11-08 PROCEDURE — 85610 PROTHROMBIN TIME: CPT

## 2017-11-08 PROCEDURE — 83880 ASSAY OF NATRIURETIC PEPTIDE: CPT

## 2017-11-08 PROCEDURE — 71010 DX-CHEST-PORTABLE (1 VIEW): CPT

## 2017-11-08 PROCEDURE — 81001 URINALYSIS AUTO W/SCOPE: CPT

## 2017-11-08 PROCEDURE — 84484 ASSAY OF TROPONIN QUANT: CPT

## 2017-11-08 RX ORDER — CLONIDINE HYDROCHLORIDE 0.2 MG/1
0.2 TABLET ORAL 2 TIMES DAILY
Status: ON HOLD | COMMUNITY
End: 2017-11-10

## 2017-11-08 RX ORDER — SODIUM CHLORIDE 9 MG/ML
INJECTION, SOLUTION INTRAVENOUS CONTINUOUS
Status: DISCONTINUED | OUTPATIENT
Start: 2017-11-08 | End: 2017-11-08

## 2017-11-08 RX ORDER — ATORVASTATIN CALCIUM 40 MG/1
40 TABLET, FILM COATED ORAL NIGHTLY
Status: DISCONTINUED | OUTPATIENT
Start: 2017-11-08 | End: 2017-11-10 | Stop reason: HOSPADM

## 2017-11-08 RX ORDER — POLYETHYLENE GLYCOL 3350 17 G/17G
1 POWDER, FOR SOLUTION ORAL
Status: DISCONTINUED | OUTPATIENT
Start: 2017-11-08 | End: 2017-11-10 | Stop reason: HOSPADM

## 2017-11-08 RX ORDER — WARFARIN SODIUM 1 MG/1
1 TABLET ORAL
Status: COMPLETED | OUTPATIENT
Start: 2017-11-08 | End: 2017-11-08

## 2017-11-08 RX ORDER — BISACODYL 10 MG
10 SUPPOSITORY, RECTAL RECTAL
Status: DISCONTINUED | OUTPATIENT
Start: 2017-11-08 | End: 2017-11-10 | Stop reason: HOSPADM

## 2017-11-08 RX ORDER — SODIUM CHLORIDE 9 MG/ML
INJECTION, SOLUTION INTRAVENOUS CONTINUOUS
Status: DISPENSED | OUTPATIENT
Start: 2017-11-08 | End: 2017-11-09

## 2017-11-08 RX ORDER — ATORVASTATIN CALCIUM 40 MG/1
40 TABLET, FILM COATED ORAL NIGHTLY
Status: ON HOLD | COMMUNITY
End: 2017-11-10

## 2017-11-08 RX ORDER — AMOXICILLIN 250 MG
2 CAPSULE ORAL 2 TIMES DAILY
Status: DISCONTINUED | OUTPATIENT
Start: 2017-11-08 | End: 2017-11-10 | Stop reason: HOSPADM

## 2017-11-08 RX ADMIN — SODIUM CHLORIDE: 9 INJECTION, SOLUTION INTRAVENOUS at 11:43

## 2017-11-08 RX ADMIN — SODIUM CHLORIDE: 9 INJECTION, SOLUTION INTRAVENOUS at 15:45

## 2017-11-08 RX ADMIN — ATORVASTATIN CALCIUM 40 MG: 40 TABLET, FILM COATED ORAL at 20:58

## 2017-11-08 RX ADMIN — WARFARIN SODIUM 1 MG: 1 TABLET ORAL at 20:58

## 2017-11-08 ASSESSMENT — PAIN SCALES - GENERAL
PAINLEVEL_OUTOF10: 0
PAINLEVEL_OUTOF10: 0

## 2017-11-08 ASSESSMENT — PATIENT HEALTH QUESTIONNAIRE - PHQ9
SUM OF ALL RESPONSES TO PHQ9 QUESTIONS 1 AND 2: 0
1. LITTLE INTEREST OR PLEASURE IN DOING THINGS: NOT AT ALL
2. FEELING DOWN, DEPRESSED, IRRITABLE, OR HOPELESS: NOT AT ALL
SUM OF ALL RESPONSES TO PHQ QUESTIONS 1-9: 0

## 2017-11-08 ASSESSMENT — ENCOUNTER SYMPTOMS: MEMORY LOSS: 1

## 2017-11-08 ASSESSMENT — LIFESTYLE VARIABLES: ALCOHOL_USE: NO

## 2017-11-08 NOTE — ED NOTES
Pt bib ems from [facility] living. Pt had pacemaker placed on 11/6 for bradycardia. Pt hx of afib. Pt found to be drowsy and with low BP this AM. Pt found to have systolic bp in the 60's pta. Pt hx of dementia. Pt with redness around pacemaker.

## 2017-11-08 NOTE — PROGRESS NOTES
Patient is being discharged to [facility].    Patient is AOx3, son Terrence is at bedside and is able to verbalize understanding of discharge education/instructions.    Coumadin and pacemaker placement education/instructions provided to patient and son.    IV and tele monitor D/C.

## 2017-11-08 NOTE — ED NOTES
Med rec updated and complete  Allergies reviewed  Pt does not know his medications.  Received a MAR from [place] [facility]  Per MAR shows, pt may have VODKA before dinner.  Last time pt had VODKA was on 11/7/2017 @ 1700.

## 2017-11-08 NOTE — DISCHARGE INSTRUCTIONS
Discharge Instructions    Discharged to other by car with relative. Discharged via wheelchair, hospital escort: Yes.  Special equipment needed: Immobilizer    Be sure to schedule a follow-up appointment with your primary care doctor or any specialists as instructed.     Discharge Plan:   Diet Plan: Discussed  Activity Level: Discussed  Confirmed Follow up Appointment: Appointment Scheduled  Confirmed Symptoms Management: Discussed  Medication Reconciliation Updated: Yes  Influenza Vaccine Indication: Patient Refuses    I understand that a diet low in cholesterol, fat, and sodium is recommended for good health. Unless I have been given specific instructions below for another diet, I accept this instruction as my diet prescription.   Other diet: Cardiac diet as tolerated - low sodium, low fat, low cholesterol    Special Instructions:     Discharge Instructions for Pacemaker Implantation  You have had a procedure to insert a pacemaker. Once inside your body, this small electronic device helps keep your heart from beating too slowly. A pacemaker can't fix existing heart problems. But it can help you feel better and have more energy. As you recover, follow all of the instructions you are given, including those below.  Activity  · Don't drive until your doctor says it's OK. Plan to have someone drive you home after the procedure.  · Follow the instructions you are given about limiting your activity.  · If you are fitted with an arm sling, keep your arm in the sling for as long as your doctor tells you to. Most often, the sling will be removed the following day though you may be instructed to sleep with it on for a period to prevent damage to the pacemaker while it's healing.  · Don't raise your arm on the incision side above shoulder level or stretch your arm behind your back for as long as directed by your doctor. This gives the leads a chance to secure themselves inside your heart.  · Ask your doctor when you can expect  "to return to work. Depending on the type of work you do, you may have restrictions until your cardiologist clears you for unrestricted activity.  · You can still exercise. It's good for your body and your heart. Talk with your doctor about an exercise plan and the types of exercise to minimize the risk of damaging your pacemaker.  Other precautions  · Follow your doctor's directions carefully for wound care. If there is a dressing, ask whether you should remove it or keep it on until your next visit. Never put any creams, lotions, or products like peroxide on an incision unless your doctor tells you to. Do not get the incision wet until your doctor says it's OK.  · Every day, take your temperature and check your incision for signs of infection (redness, swelling, drainage, or warmth). Do this for 7 days or as advised by your doctor.  · Learn to take your own pulse. Keep a record of your results. Ask your doctor what pulse rate means you should call for medical attention.  · Before you receive any treatment, tell all healthcare providers (including your dentist) that you have a pacemaker.  · You will be given an ID card that contains information about your pacemaker. Always carry this card with you. You can show this card if your pacemaker sets off a metal detector. You should also show it to avoid screening with a hand-held security wand.  · Keep your cell phone away from your pacemaker. Don't carry the phone in your shirt pocket overlying the pacemaker, even when it's turned off.  · Stay away from strong magnets. Examples are those used in MRIs or in hand-held security wands. Some pacemakers are now safe to use with MRI scanners. Ask your doctor if you have such a pacemaker.  · Stay way from strong electrical fields. Examples are those made by radio transmitting towers, \"ham\" radios, and heavy-duty electrical equipment.  · Don't lean over the open jackson of a running car. A running engine creates an electrical field. " Most household and yard appliances will not cause any problems. If you use any large power tools, such as an industrial , talk with your doctor.  Follow-up care  · See your cardiologist in the next 7 to 10 days. Call and make an appointment as soon as you get home.  · Make regular follow-up appointments with your doctor. He or she will check the pacemaker to make sure it's working properly.  · Plan on having periodic checkups with your healthcare provider to evaluate the battery life of your pacemaker. Depending on your device and how much your body uses the pacing functions of the pacemaker, you will need a new device generator implanted at some point, generally about every 5 to 7 years.  · Some pacemakers have a built-in antenna that can transmit information such as trouble alerts over the internet to your doctor. Ask your doctor if your pacemaker is capable of remote monitoring.     Call 911  Call 911 if you have:  · Chest pain  · Severe trouble breathing  When to call your healthcare provider  Call your healthcare provider right away if you have any of these:  · Dizziness  · Lack of energy  · Fainting spells  · Twitching chest muscles  · Rapid pulse or pounding heartbeat  · Shortness of breath  · Pain around your pacemaker  · Fever above 100.4° F (38° C) or higher, or as directed by your healthcare provider  · Other signs of infection such as redness, swelling, drainage, or warmth at the incision site  · Your incision is not healing or your incision separates or opens  · Hiccups that won't stop  · Redness, severe swelling, drainage, worsening pain, bleeding, or warmth at the incision site  · If your pacemaker generator feels loose or like it is wiggling in the pocket under the skin         · Is patient discharged on Warfarin / Coumadin?   Yes    You are receiving the drug warfarin. Please understand the importance of monitoring warfarin with scheduled PT/INR blood draws.  Follow-up with a call to your  "personal Doctor's office in 3 days to schedule a PT/INR. .    IMPORTANT: HOW TO USE THIS INFORMATION:  This is a summary and does NOT have all possible information about this product. This information does not assure that this product is safe, effective, or appropriate for you. This information is not individual medical advice and does not substitute for the advice of your health care professional. Always ask your health care professional for complete information about this product and your specific health needs.      WARFARIN - ORAL (WARF-uh-rin)      COMMON BRAND NAME(S): Coumadin      WARNING:  Warfarin can cause very serious (possibly fatal) bleeding. This is more likely to occur when you first start taking this medication or if you take too much warfarin. To decrease your risk for bleeding, your doctor or other health care provider will monitor you closely and check your lab results (INR test) to make sure you are not taking too much warfarin. Keep all medical and laboratory appointments. Tell your doctor right away if you notice any signs of serious bleeding. See also Side Effects section.      USES:  This medication is used to treat blood clots (such as in deep vein thrombosis-DVT or pulmonary embolus-PE) and/or to prevent new clots from forming in your body. Preventing harmful blood clots helps to reduce the risk of a stroke or heart attack. Conditions that increase your risk of developing blood clots include a certain type of irregular heart rhythm (atrial fibrillation), heart valve replacement, recent heart attack, and certain surgeries (such as hip/knee replacement). Warfarin is commonly called a \"blood thinner,\" but the more correct term is \"anticoagulant.\" It helps to keep blood flowing smoothly in your body by decreasing the amount of certain substances (clotting proteins) in your blood.      HOW TO USE:  Read the Medication Guide provided by your pharmacist before you start taking warfarin and each " time you get a refill. If you have any questions, ask your doctor or pharmacist. Take this medication by mouth with or without food as directed by your doctor or other health care professional, usually once a day. It is very important to take it exactly as directed. Do not increase the dose, take it more frequently, or stop using it unless directed by your doctor. Dosage is based on your medical condition, laboratory tests (such as INR), and response to treatment. Your doctor or other health care provider will monitor you closely while you are taking this medication to determine the right dose for you. Use this medication regularly to get the most benefit from it. To help you remember, take it at the same time each day. It is important to eat a balanced, consistent diet while taking warfarin. Some foods can affect how warfarin works in your body and may affect your treatment and dose. Avoid sudden large increases or decreases in your intake of foods high in vitamin K (such as broccoli, cauliflower, cabbage, brussels sprouts, kale, spinach, and other green leafy vegetables, liver, green tea, certain vitamin supplements). If you are trying to lose weight, check with your doctor before you try to go on a diet. Cranberry products may also affect how your warfarin works. Limit the amount of cranberry juice (16 ounces/480 milliliters a day) or other cranberry products you may drink or eat.      SIDE EFFECTS:  Nausea, loss of appetite, or stomach/abdominal pain may occur. If any of these effects persist or worsen, tell your doctor or pharmacist promptly. Remember that your doctor has prescribed this medication because he or she has judged that the benefit to you is greater than the risk of side effects. Many people using this medication do not have serious side effects. This medication can cause serious bleeding if it affects your blood clotting proteins too much (shown by unusually high INR lab results). Even if your  doctor stops your medication, this risk of bleeding can continue for up to a week. Tell your doctor right away if you have any signs of serious bleeding, including: unusual pain/swelling/discomfort, unusual/easy bruising, prolonged bleeding from cuts or gums, persistent/frequent nosebleeds, unusually heavy/prolonged menstrual flow, pink/dark urine, coughing up blood, vomit that is bloody or looks like coffee grounds, severe headache, dizziness/fainting, unusual or persistent tiredness/weakness, bloody/black/tarry stools, chest pain, shortness of breath, difficulty swallowing. Tell your doctor right away if any of these unlikely but serious side effects occur: persistent nausea/vomiting, severe stomach/abdominal pain, yellowing eyes/skin. This drug rarely has caused very serious (possibly fatal) problems if its effects lead to small blood clots (usually at the beginning of treatment). This can lead to severe skin/tissue damage that may require surgery or amputation if left untreated. Patients with certain blood conditions (protein C or S deficiency) may be at greater risk. Get medical help right away if any of these rare but serious side effects occur: painful/red/purplish patches on the skin (such as on the toe, breast, abdomen), change in the amount of urine, vision changes, confusion, slurred speech, weakness on one side of the body. A very serious allergic reaction to this drug is rare. However, get medical help right away if you notice any symptoms of a serious allergic reaction, including: rash, itching/swelling (especially of the face/tongue/throat), severe dizziness, trouble breathing. This is not a complete list of possible side effects. If you notice other effects not listed above, contact your doctor or pharmacist. In the [place] - Call your doctor for medical advice about side effects. You may report side effects to FDA at 1-538-FDA-7277. In [place] - Call your doctor for medical advice about side effects. You  may report side effects to [facility] [place] at [phone].      PRECAUTIONS:  Before taking warfarin, tell your doctor or pharmacist if you are allergic to it; or if you have any other allergies. This product may contain inactive ingredients, which can cause allergic reactions or other problems. Talk to your pharmacist for more details. Before using this medication, tell your doctor or pharmacist your medical history, especially of: blood disorders (such as anemia, hemophilia), bleeding problems (such as bleeding of the stomach/intestines, bleeding in the brain), blood vessel disorders (such as aneurysms), recent major injury/surgery, liver disease, alcohol use, mental/mood disorders (including memory problems), frequent falls/injuries. It is important that all your doctors and dentists know that you take warfarin. Before having surgery or any medical/dental procedures, tell your doctor or dentist that you are taking this medication and about all the products you use (including prescription drugs, nonprescription drugs, and herbal products). Avoid getting injections into the muscles. If you must have an injection into a muscle (for example, a flu shot), it should be given in the arm. This way, it will be easier to check for bleeding and/or apply pressure bandages. This medication may cause stomach bleeding. Daily use of alcohol while using this medicine will increase your risk for stomach bleeding and may also affect how this medication works. Limit or avoid alcoholic beverages. If you have not been eating well, if you have an illness or infection that causes fever, vomiting, or diarrhea for more than 2 days, or if you start using any antibiotic medications, contact your doctor or pharmacist immediately because these conditions can affect how warfarin works. This medication can cause heavy bleeding. To lower the chance of getting cut, bruised, or injured, use great caution with sharp objects like safety razors and  nail cutters. Use an electric razor when shaving and a soft toothbrush when brushing your teeth. Avoid activities such as contact sports. If you fall or injure yourself, especially if you hit your head, call your doctor immediately. Your doctor may need to check you. The Food & Drug Administration has stated that generic warfarin products are interchangeable. However, consult your doctor or pharmacist before switching warfarin products. Be careful not to take more than one medication that contains warfarin unless specifically directed by the doctor or health care provider who is monitoring your warfarin treatment. Older adults may be at greater risk for bleeding while using this drug. This medication is not recommended for use during pregnancy because of serious (possibly fatal) harm to an unborn baby. Discuss the use of reliable forms of birth control with your doctor. If you become pregnant or think you may be pregnant, tell your doctor immediately. If you are planning pregnancy, discuss a plan for managing your condition with your doctor before you become pregnant. Your doctor may switch the type of medication you use during pregnancy. Very small amounts of this medication may pass into breast milk but is unlikely to harm a nursing infant. Consult your doctor before breast-feeding.      DRUG INTERACTIONS:  Drug interactions may change how your medications work or increase your risk for serious side effects. This document does not contain all possible drug interactions. Keep a list of all the products you use (including prescription/nonprescription drugs and herbal products) and share it with your doctor and pharmacist. Do not start, stop, or change the dosage of any medicines without your doctor's approval. Warfarin interacts with many prescription, nonprescription, vitamin, and herbal products. This includes medications that are applied to the skin or inside the vagina or rectum. The interactions with warfarin  "usually result in an increase or decrease in the \"blood-thinning\" (anticoagulant) effect. Your doctor or other health care professional should closely monitor you to prevent serious bleeding or clotting problems. While taking warfarin, it is very important to tell your doctor or pharmacist of any changes in medications, vitamins, or herbal products that you are taking. Some products that may interact with this drug include: capecitabine, imatinib, mifepristone. Aspirin, aspirin-like drugs (salicylates), and nonsteroidal anti-inflammatory drugs (NSAIDs such as ibuprofen, naproxen, celecoxib) may have effects similar to warfarin. These drugs may increase the risk of bleeding problems if taken during treatment with warfarin. Carefully check all prescription/nonprescription product labels (including drugs applied to the skin such as pain-relieving creams) since the products may contain NSAIDs or salicylates. Talk to your doctor about using a different medication (such as acetaminophen) to treat pain/fever. Low-dose aspirin and related drugs (such as clopidogrel, ticlopidine) should be continued if prescribed by your doctor for specific medical reasons such as heart attack or stroke prevention. Consult your doctor or pharmacist for more details. Many herbal products interact with warfarin. Tell your doctor before taking any herbal products, especially bromelains, coenzyme Q10, cranberry, danshen, dong quai, fenugreek, garlic, ginkgo biloba, ginseng, and Jeannette's wort, among others. This medication may interfere with a certain laboratory test to measure theophylline levels, possibly causing false test results. Make sure laboratory personnel and all your doctors know you use this drug.      OVERDOSE:  If overdose is suspected, contact a poison control center or emergency room immediately. [place] residents can call the [place] National Poison Hotline at 1-638.140.7089. [place] residents can call a provincial poison control center. " Symptoms of overdose may include: bloody/black/tarry stools, pink/dark urine, unusual/prolonged bleeding.      NOTES:  Do not share this medication with others. Laboratory and/or medical tests (such as INR, complete blood count) must be performed periodically to monitor your progress or check for side effects. Consult your doctor for more details.      MISSED DOSE:  For the best possible benefit, do not miss any doses. If you do miss a dose and remember on the same day, take it as soon as you remember. If you remember on the next day, skip the missed dose and resume your usual dosing schedule. Do not double the dose to catch up because this could increase your risk for bleeding. Keep a record of missed doses to give to your doctor or pharmacist. Contact your doctor or pharmacist if you miss 2 or more doses in a row.      STORAGE:  Store at room temperature away from light and moisture. Do not store in the bathroom. Keep all medications away from children and pets. Do not flush medications down the toilet or pour them into a drain unless instructed to do so. Properly discard this product when it is  or no longer needed. Consult your pharmacist or local waste disposal company for more details about how to safely discard your product.      MEDICAL ALERT:  Your condition and medication can cause complications in a medical emergency. For information about enrolling in MedicAlert, call 1-148.349.8627 ([place]) or 1-191.794.1444 ([place]).      Information last revised 2010 Copyright(c) 2010 [employer]             · Is patient Post Blood Transfusion?  No    Depression / Suicide Risk    As you are discharged from this [facility] facility, it is important to learn how to keep safe from harming yourself.    Recognize the warning signs:  · Abrupt changes in personality, positive or negative- including increase in energy   · Giving away possessions  · Change in eating patterns- significant weight changes-   positive or negative  · Change in sleeping patterns- unable to sleep or sleeping all the time   · Unwillingness or inability to communicate  · Depression  · Unusual sadness, discouragement and loneliness  · Talk of wanting to die  · Neglect of personal appearance   · Rebelliousness- reckless behavior  · Withdrawal from people/activities they love  · Confusion- inability to concentrate     If you or a loved one observes any of these behaviors or has concerns about self-harm, here's what you can do:  · Talk about it- your feelings and reasons for harming yourself  · Remove any means that you might use to hurt yourself (examples: pills, rope, extension cords, firearm)  · Get professional help from the community (Mental Health, Substance Abuse, psychological counseling)  · Do not be alone:Call your Safe Contact- someone whom you trust who will be there for you.  · Call your local CRISIS HOTLINE 360-2974 or 463-399-1474  · Call your local Children's Mobile Crisis Response Team [place] (977) 907-1469 or www.[employer]  · Call the toll free National Suicide Prevention Hotlines   · National Suicide Prevention Lifeline 876-990-QIYB (0965)  · National Hope Line Network 800-SUICIDE (163-1494)

## 2017-11-08 NOTE — ED NOTES
interrogated pacemaker. Per report pt had multiple episodes of afib in 160-170 this AM. Per son pt also was given clonidine lastnight and this AM possibly and was instructed not to take.

## 2017-11-08 NOTE — DISCHARGE SUMMARY
CHIEF COMPLAINT ON ADMISSION  Chief Complaint   Patient presents with   • Syncope   • Hypotension       CODE STATUS  Prior    HPI & HOSPITAL COURSE  This is a 89 y.o. Male who was admitted after presenting to ER complaining of syncope episode and hypotension. Pt was also found to have  MAINE, probably due to dehydration. He was found to have bradycardia. So cardiology was consulted. Pt was started on IVF , his hypotension resolved. Cardiology recommended PPM, which was placed  On 11/6/17. He tolerated procedure well. And did not had any more syncopal episode.  Per cardiology stand point pt is ok for discharge with follow up in 1 week.   Pt was seen by physical therapy and ok to be discharged back to his assisting leaving with physical therapy as outpt.   Pt will be discharged home today.          DISCHARGE PROBLEM LIST  Principal Problem:    Syncope POA: Yes  Active Problems:    Bradycardia POA: Yes    Hypotension POA: Yes    Elevated brain natriuretic peptide (BNP) level POA: Yes    Renal failure POA: Yes    HTN (hypertension) POA: Yes    Alcohol use POA: Yes    History of stroke POA: Yes    Hypokalemia POA: Yes    Chronic atrial fibrillation (CMS-HCC) POA: Yes    Alzheimer's disease POA: Yes    Dyslipidemia POA: Yes  Resolved Problems:    * No resolved hospital problems. *      FOLLOW UP  Future Appointments  Date Time Provider Department Center   11/13/2017 10:45 AM PACER CHECK-[facility] None     Primary Care Provider    Schedule an appointment as soon as possible for a visit in 1 week  Hospital follow-up appointment with PCP      MEDICATIONS ON DISCHARGE   Elton Olson   Home Medication Instructions LEONID:[id number]    Printed on:11/08/17 1134   Medication Information                      atorvastatin (LIPITOR) 20 MG Tab  Take 40 mg by mouth every evening.             chlorthalidone (HYGROTON) 25 MG Tab  Take 25 mg by mouth every day.             meloxicam (MOBIC) 7.5 MG Tab  Take 7.5 mg by mouth every  evening.             potassium chloride SA (KDUR) 20 MEQ Tab CR  Take 20 mEq by mouth 2 times a day.             warfarin (COUMADIN) 5 MG Tab  Take 5 mg by mouth every evening.                 DIET  No orders of the defined types were placed in this encounter.      ACTIVITY  As tolerated.        CONSULTATIONS  Cardiology    PROCEDURES  PPM placement     LABORATORY  Lab Results   Component Value Date/Time    SODIUM 139 11/07/2017 04:22 AM    POTASSIUM 3.3 (L) 11/07/2017 04:22 AM    CHLORIDE 106 11/07/2017 04:22 AM    CO2 27 11/07/2017 04:22 AM    GLUCOSE 100 (H) 11/07/2017 04:22 AM    BUN 26 (H) 11/07/2017 04:22 AM    CREATININE 1.05 11/07/2017 04:22 AM        Lab Results   Component Value Date/Time    WBC 8.8 11/08/2017 11:16 AM    HEMOGLOBIN 12.6 (L) 11/08/2017 11:16 AM    HEMATOCRIT 34.8 (L) 11/08/2017 11:16 AM    PLATELETCT 180 11/08/2017 11:16 AM        Total time of the discharge process exceeds 38 minutes

## 2017-11-08 NOTE — ED PROVIDER NOTES
ED Provider Note    CHIEF COMPLAINT  Chief Complaint   Patient presents with   • Hypotension       HPI  Elton Olson is a 89 y.o. male who presents with episode of decreased responsiveness at the extended care facility that he lives in. EMS arrived to find his blood pressure 60/40, was given 400 mL of fluid and was more responsive. He denies any chest pain denies shortness of breath denies headache neck pain abdominal pain extremity pain upper or lower back pain and no shortness of breath no fever no chills no nausea no vomiting no dysuria area does have a history of a pacemaker placed about 3 days ago    REVIEW OF SYSTEMS  See HPI for further details. History of arthritis atrial fibrillation dementia elevated lipidsDenies other G.I., G.U.. endrocine, cardiovascular, respriatory or neurological problems.  All other systems are negative.     PAST MEDICAL HISTORY  Past Medical History:   Diagnosis Date   • Arthritis    • Atrial fibrillation (CMS-HCC)    • Dementia    • Dyslipidemia        FAMILY HISTORY  No family history on file.    SOCIAL HISTORY  Social History     Social History   • Marital status: Unknown     Spouse name: N/A   • Number of children: N/A   • Years of education: N/A     Social History Main Topics   • Smoking status: Unknown If Ever Smoked   • Smokeless tobacco: Never Used   • Alcohol use Yes      Comment: Vodka q day   • Drug use: Unknown   • Sexual activity: Not on file     Other Topics Concern   • Not on file     Social History Narrative   • No narrative on file       SURGICAL HISTORY  No past surgical history on file.    CURRENT MEDICATIONS  Home Medications    **Home medications have not yet been reviewed for this encounter**         ALLERGIES  No Known Allergies    PHYSICAL EXAM  VITAL SIGNS: BP (!) 99/58   Pulse 65   Temp 37.2 °C (99 °F)   Resp 16   Wt 72.1 kg (159 lb)   SpO2 99%   BMI 21.56 kg/m²    Constitutional: Well developed, Well nourished, No acute distress, Non-toxic  appearance.   HENT: Normocephalic, Atraumatic, Bilateral external ears normal, Oropharynx moist, No oral exudates, Nose normal.   Eyes: PERRL, EOMI, Conjunctiva normal, No discharge.   Neck: Normal range of motion, No tenderness, Supple, No stridor.   Lymphatic: No lymphadenopathy noted.   Cardiovascular: Normal heart rate, Normal rhythm, No murmurs, No rubs, No gallops.   Thorax & Lungs: Normal breath sounds, No respiratory distress, No wheezing, No chest tenderness.Dressing over left chest where a recent pacemaker was placed     Abdomen:  No tenderness, no guarding no rigidity and the abdomen is soft.  No masses, No pulsatile masses.  Skin: Warm, Dry, No erythema, No rash.   Back: No tenderness, No CVA tenderness.   Extremities: Intact distal pulses, No edema, No tenderness, No cyanosis, No clubbing.   Musculoskeletal: Good range of motion in all major joints. No tenderness to palpation or major deformities noted.   Neurologic: Alert & oriented x 3, Normal motor function, Normal sensory function, No focal deficits noted.   Psychiatric: Affect normal, Judgment normal, Mood normal.   EKG Interpretation    Interpreted by me    Rhythm: Paced rhythm  Rate: 16  Axis: normal  Ectopy: none  Conduction: Normal for pacemaker Pacemaker  ST Segments: ST-T wave changes consistent with pacemaker Clinical Impression: I do not have an old EKG to compare to      RADIOLOGY/PROCEDURES  DX-CHEST-PORTABLE (1 VIEW)   Final Result      No acute cardiopulmonary abnormality identified.            COURSE & MEDICAL DECISION MAKING  Pertinent Labs & Imaging studies reviewed. (See chart for details) white count normal hematocrit 35 platelet count 180. 84 polys. Electrolytes, unremarkable renal function, BUN elevated creatinine normal. BNP, 540, troponin 0.06 INR, 3 points X3, therapeutic    He has a history of atrial fibrillation, recent pacemaker. Episode of hypotension today. EKG today shows a paced rhythm rate of 60 however occasionally  his rate will drop to 30. I think there is probably a problem with a pacemaker. We'll call the  for interrogation. I will talk with the cardiologist about admission  Still cannot rule out another cause for hypotension. He does have a normal white count with 84 polys, concern for another process. Urinalysis, no evidence of infection, chest x-ray does not demonstrate infection. I'm not seeing evidence of inflammation around the pacemaker.   I have talked to cardiology who will evaluate this patient, hospitalist to admit  FINAL IMPRESSION  1.  1. Hypotension, unspecified hypotension type    2. Lethargy    3. Bradycardia         2.   3.     Disposition  Hospitalist to admit, cardiology consulting  Electronically signed by: Zan Leblanc, 11/8/2017 11:17 AM

## 2017-11-08 NOTE — PROGRESS NOTES
Patient is being discharged back to [facility]. His son, Terrence Olson is driving him to the [facility].    Terrence Olson's identification was confirmed with 's license. Copy was made and was placed in chart.

## 2017-11-09 PROBLEM — E43 SEVERE PROTEIN-CALORIE MALNUTRITION (HCC): Status: ACTIVE | Noted: 2017-11-09

## 2017-11-09 PROBLEM — Z66 DNR (DO NOT RESUSCITATE): Status: ACTIVE | Noted: 2017-11-09

## 2017-11-09 LAB
ANION GAP SERPL CALC-SCNC: 8 MMOL/L (ref 0–11.9)
BASOPHILS # BLD AUTO: 0.5 % (ref 0–1.8)
BASOPHILS # BLD: 0.04 K/UL (ref 0–0.12)
BUN SERPL-MCNC: 29 MG/DL (ref 8–22)
CALCIUM SERPL-MCNC: 8.3 MG/DL (ref 8.5–10.5)
CHLORIDE SERPL-SCNC: 106 MMOL/L (ref 96–112)
CO2 SERPL-SCNC: 24 MMOL/L (ref 20–33)
CREAT SERPL-MCNC: 1.11 MG/DL (ref 0.5–1.4)
EOSINOPHIL # BLD AUTO: 0.28 K/UL (ref 0–0.51)
EOSINOPHIL NFR BLD: 3.6 % (ref 0–6.9)
ERYTHROCYTE [DISTWIDTH] IN BLOOD BY AUTOMATED COUNT: 45.3 FL (ref 35.9–50)
GFR SERPL CREATININE-BSD FRML MDRD: >60 ML/MIN/1.73 M 2
GLUCOSE SERPL-MCNC: 95 MG/DL (ref 65–99)
HCT VFR BLD AUTO: 33.7 % (ref 42–52)
HGB BLD-MCNC: 12.2 G/DL (ref 14–18)
IMM GRANULOCYTES # BLD AUTO: 0.02 K/UL (ref 0–0.11)
IMM GRANULOCYTES NFR BLD AUTO: 0.3 % (ref 0–0.9)
INR PPP: 3.38 (ref 0.87–1.13)
LYMPHOCYTES # BLD AUTO: 1.63 K/UL (ref 1–4.8)
LYMPHOCYTES NFR BLD: 21.1 % (ref 22–41)
MAGNESIUM SERPL-MCNC: 1.5 MG/DL (ref 1.5–2.5)
MCH RBC QN AUTO: 32.4 PG (ref 27–33)
MCHC RBC AUTO-ENTMCNC: 36.2 G/DL (ref 33.7–35.3)
MCV RBC AUTO: 89.4 FL (ref 81.4–97.8)
MONOCYTES # BLD AUTO: 0.53 K/UL (ref 0–0.85)
MONOCYTES NFR BLD AUTO: 6.9 % (ref 0–13.4)
NEUTROPHILS # BLD AUTO: 5.22 K/UL (ref 1.82–7.42)
NEUTROPHILS NFR BLD: 67.6 % (ref 44–72)
NRBC # BLD AUTO: 0 K/UL
NRBC BLD AUTO-RTO: 0 /100 WBC
PLATELET # BLD AUTO: 177 K/UL (ref 164–446)
PMV BLD AUTO: 9.5 FL (ref 9–12.9)
POTASSIUM SERPL-SCNC: 3 MMOL/L (ref 3.6–5.5)
PROTHROMBIN TIME: 33.9 SEC (ref 12–14.6)
RBC # BLD AUTO: 3.77 M/UL (ref 4.7–6.1)
SODIUM SERPL-SCNC: 138 MMOL/L (ref 135–145)
WBC # BLD AUTO: 7.7 K/UL (ref 4.8–10.8)

## 2017-11-09 PROCEDURE — 97161 PT EVAL LOW COMPLEX 20 MIN: CPT

## 2017-11-09 PROCEDURE — 700102 HCHG RX REV CODE 250 W/ 637 OVERRIDE(OP)

## 2017-11-09 PROCEDURE — 700102 HCHG RX REV CODE 250 W/ 637 OVERRIDE(OP): Performed by: INTERNAL MEDICINE

## 2017-11-09 PROCEDURE — G8987 SELF CARE CURRENT STATUS: HCPCS | Mod: CJ

## 2017-11-09 PROCEDURE — 700102 HCHG RX REV CODE 250 W/ 637 OVERRIDE(OP): Performed by: NURSE PRACTITIONER

## 2017-11-09 PROCEDURE — 36415 COLL VENOUS BLD VENIPUNCTURE: CPT

## 2017-11-09 PROCEDURE — 99232 SBSQ HOSP IP/OBS MODERATE 35: CPT | Performed by: INTERNAL MEDICINE

## 2017-11-09 PROCEDURE — G8988 SELF CARE GOAL STATUS: HCPCS | Mod: CI

## 2017-11-09 PROCEDURE — A9270 NON-COVERED ITEM OR SERVICE: HCPCS | Performed by: NURSE PRACTITIONER

## 2017-11-09 PROCEDURE — A9270 NON-COVERED ITEM OR SERVICE: HCPCS | Performed by: INTERNAL MEDICINE

## 2017-11-09 PROCEDURE — G8979 MOBILITY GOAL STATUS: HCPCS | Mod: CI

## 2017-11-09 PROCEDURE — 700105 HCHG RX REV CODE 258: Performed by: INTERNAL MEDICINE

## 2017-11-09 PROCEDURE — G8978 MOBILITY CURRENT STATUS: HCPCS | Mod: CJ

## 2017-11-09 PROCEDURE — 83735 ASSAY OF MAGNESIUM: CPT

## 2017-11-09 PROCEDURE — 80048 BASIC METABOLIC PNL TOTAL CA: CPT

## 2017-11-09 PROCEDURE — 97166 OT EVAL MOD COMPLEX 45 MIN: CPT

## 2017-11-09 PROCEDURE — 85610 PROTHROMBIN TIME: CPT

## 2017-11-09 PROCEDURE — A9270 NON-COVERED ITEM OR SERVICE: HCPCS

## 2017-11-09 PROCEDURE — 770020 HCHG ROOM/CARE - TELE (206)

## 2017-11-09 PROCEDURE — 700111 HCHG RX REV CODE 636 W/ 250 OVERRIDE (IP): Performed by: INTERNAL MEDICINE

## 2017-11-09 PROCEDURE — 85025 COMPLETE CBC W/AUTO DIFF WBC: CPT

## 2017-11-09 RX ORDER — POTASSIUM CHLORIDE 20 MEQ/1
40 TABLET, EXTENDED RELEASE ORAL ONCE
Status: COMPLETED | OUTPATIENT
Start: 2017-11-09 | End: 2017-11-09

## 2017-11-09 RX ORDER — WARFARIN SODIUM 2.5 MG/1
2.5 TABLET ORAL
Status: COMPLETED | OUTPATIENT
Start: 2017-11-09 | End: 2017-11-09

## 2017-11-09 RX ORDER — POTASSIUM CHLORIDE 20 MEQ/1
40 TABLET, EXTENDED RELEASE ORAL DAILY
Status: DISCONTINUED | OUTPATIENT
Start: 2017-11-09 | End: 2017-11-10 | Stop reason: HOSPADM

## 2017-11-09 RX ORDER — MAGNESIUM SULFATE HEPTAHYDRATE 40 MG/ML
2 INJECTION, SOLUTION INTRAVENOUS ONCE
Status: COMPLETED | OUTPATIENT
Start: 2017-11-09 | End: 2017-11-09

## 2017-11-09 RX ORDER — DIGOXIN 125 MCG
125 TABLET ORAL DAILY
Status: DISCONTINUED | OUTPATIENT
Start: 2017-11-09 | End: 2017-11-10 | Stop reason: HOSPADM

## 2017-11-09 RX ADMIN — DIGOXIN 125 MCG: 125 TABLET ORAL at 12:34

## 2017-11-09 RX ADMIN — POTASSIUM CHLORIDE 40 MEQ: 1500 TABLET, EXTENDED RELEASE ORAL at 14:02

## 2017-11-09 RX ADMIN — POTASSIUM CHLORIDE 40 MEQ: 1500 TABLET, EXTENDED RELEASE ORAL at 04:59

## 2017-11-09 RX ADMIN — MAGNESIUM SULFATE IN WATER 2 G: 40 INJECTION, SOLUTION INTRAVENOUS at 04:59

## 2017-11-09 RX ADMIN — ATORVASTATIN CALCIUM 40 MG: 40 TABLET, FILM COATED ORAL at 20:26

## 2017-11-09 RX ADMIN — WARFARIN SODIUM 2.5 MG: 2.5 TABLET ORAL at 17:36

## 2017-11-09 RX ADMIN — STANDARDIZED SENNA CONCENTRATE AND DOCUSATE SODIUM 2 TABLET: 8.6; 5 TABLET, FILM COATED ORAL at 20:25

## 2017-11-09 RX ADMIN — SODIUM CHLORIDE: 9 INJECTION, SOLUTION INTRAVENOUS at 03:38

## 2017-11-09 ASSESSMENT — COGNITIVE AND FUNCTIONAL STATUS - GENERAL
TOILETING: A LITTLE
MOVING TO AND FROM BED TO CHAIR: UNABLE
SUGGESTED CMS G CODE MODIFIER DAILY ACTIVITY: CJ
SUGGESTED CMS G CODE MODIFIER MOBILITY: CK
DRESSING REGULAR LOWER BODY CLOTHING: A LITTLE
CLIMB 3 TO 5 STEPS WITH RAILING: A LITTLE
MOBILITY SCORE: 17
MOVING FROM LYING ON BACK TO SITTING ON SIDE OF FLAT BED: UNABLE
DAILY ACTIVITIY SCORE: 21
HELP NEEDED FOR BATHING: A LITTLE

## 2017-11-09 ASSESSMENT — ENCOUNTER SYMPTOMS
FEVER: 0
ABDOMINAL PAIN: 0
DIARRHEA: 0
COUGH: 0
BRUISES/BLEEDS EASILY: 0
SHORTNESS OF BREATH: 0
NAUSEA: 0
CHILLS: 0
HEADACHES: 0
MYALGIAS: 0
WHEEZING: 0
FOCAL WEAKNESS: 0
DIZZINESS: 0
DIAPHORESIS: 0
VOMITING: 0
PALPITATIONS: 0

## 2017-11-09 ASSESSMENT — GAIT ASSESSMENTS
GAIT LEVEL OF ASSIST: SUPERVISED
DISTANCE (FEET): 250
ASSISTIVE DEVICE: FRONT WHEEL WALKER

## 2017-11-09 ASSESSMENT — CHA2DS2 SCORE
PRIOR STROKE OR TIA OR THROMBOEMBOLISM: NO
VASCULAR DISEASE: NO
DIABETES: NO
SEX: MALE
HYPERTENSION: YES
CHA2DS2 VASC SCORE: 3
AGE 75 OR GREATER: YES
CHF OR LEFT VENTRICULAR DYSFUNCTION: NO
AGE 65 TO 74: NO

## 2017-11-09 ASSESSMENT — ACTIVITIES OF DAILY LIVING (ADL): TOILETING: INDEPENDENT

## 2017-11-09 ASSESSMENT — PAIN SCALES - GENERAL: PAINLEVEL_OUTOF10: 0

## 2017-11-09 NOTE — PROGRESS NOTES
[facility] Hospitalist Progress Note    Date of Service: 2017    Chief Complaint  89 y.o. male admitted 2017 with syncope    Interval Problem Update  Pacemaker appears to be working properly  Dehydration is improved after iv fluids    Consultants/Specialty  cardiology    Disposition  snf        Review of Systems   Constitutional: Negative for chills, diaphoresis and fever.   Respiratory: Negative for cough, shortness of breath and wheezing.    Cardiovascular: Negative for chest pain, palpitations and leg swelling.   Gastrointestinal: Negative for abdominal pain, diarrhea, nausea and vomiting.   Genitourinary: Negative for dysuria.   Musculoskeletal: Negative for myalgias.   Skin: Negative for rash.   Neurological: Negative for dizziness, focal weakness and headaches.   Endo/Heme/Allergies: Does not bruise/bleed easily.      Physical Exam  Laboratory/Imaging   Hemodynamics  Temp (24hrs), Av.4 °C (97.6 °F), Min:36.2 °C (97.1 °F), Max:36.8 °C (98.2 °F)   Temperature: 36.8 °C (98.2 °F)  Pulse  Av.9  Min: 60  Max: 140 Heart Rate (Monitored): (!) 59  Blood Pressure : 138/81, NIBP: 127/70      Respiratory      Respiration: 18, Pulse Oximetry: 97 %        RUL Breath Sounds: Diminished, RML Breath Sounds: Diminished, RLL Breath Sounds: Diminished, LORETTA Breath Sounds: Diminished, LLL Breath Sounds: Diminished    Fluids    Intake/Output Summary (Last 24 hours) at 17 1303  Last data filed at 17 0600   Gross per 24 hour   Intake              770 ml   Output              300 ml   Net              470 ml       Nutrition  Orders Placed This Encounter   Procedures   • Diet Order     Standing Status:   Standing     Number of Occurrences:   1     Order Specific Question:   Diet:     Answer:   Cardiac [6]     Physical Exam   Constitutional: No distress.   HENT:   Mouth/Throat: Oropharynx is clear and moist.   Eyes: Conjunctivae are normal. No scleral icterus.   Neck: Neck supple. No JVD present.    Cardiovascular: Normal rate and intact distal pulses.  An irregularly irregular rhythm present.   No murmur heard.  Pulmonary/Chest: No respiratory distress. He has no wheezes. He has no rales.   Abdominal: Soft. There is no tenderness. There is no rebound.   Musculoskeletal: He exhibits no edema.   Neurological: Coordination normal.   Skin: Skin is warm and dry. No rash noted. He is not diaphoretic.   Psychiatric: He is agitated. Cognition and memory are impaired. He exhibits abnormal recent memory.   Nursing note and vitals reviewed.      Recent Labs      11/08/17 1116 11/09/17   0252   WBC  8.8  7.7   RBC  3.84*  3.77*   HEMOGLOBIN  12.6*  12.2*   HEMATOCRIT  34.8*  33.7*   MCV  90.6  89.4   MCH  32.8  32.4   MCHC  36.2*  36.2*   RDW  46.4  45.3   PLATELETCT  180  177   MPV  9.5  9.5     Recent Labs      11/07/17 0422 11/08/17 1116 11/09/17   0252   SODIUM  139  139  138   POTASSIUM  3.3*  3.6  3.0*   CHLORIDE  106  105  106   CO2  27  26  24   GLUCOSE  100*  134*  95   BUN  26*  25*  29*   CREATININE  1.05  1.27  1.11   CALCIUM  8.7  8.6  8.3*     Recent Labs      11/07/17 0422 11/08/17 1116 11/09/17   0252   APTT   --   49.7*   --    INR  3.70*  3.63*  3.38*     Recent Labs      11/08/17 1116   BNPBTYPENAT  540*              Assessment/Plan     * Syncope- (present on admission)   Assessment & Plan    Pacemaker does not appear to be malfunctioning  Could be secondary to dehydration  No recurrence  Cardiology evaluation in process        Hypotension- (present on admission)   Assessment & Plan    Due to dehydration  Improved after fluids        Bradycardia- (present on admission)   Assessment & Plan    Cardiology consulted  Pacemaker interrogated  Will adjust settings depending on cardiology recommendations        Chronic atrial fibrillation (CMS-HCC)- (present on admission)   Assessment & Plan    Rate controlled, continue digoxin        HTN (hypertension)- (present on admission)   Assessment &  Plan    Controlled with no need for medication at this time  Patient was on clonidine previously        Severe protein-calorie malnutrition (CMS-HCC)   Assessment & Plan    Patient is thin with diffuse muscle wasting  Will order supplements        DNR (do not resuscitate)   Assessment & Plan    Per family request        Elevated troponin- (present on admission)   Assessment & Plan    Mildly elevated, pacemaker in place and was placed recently, patient denies chest pain        Chronic anticoagulation- (present on admission)   Assessment & Plan    Monitor off coumadin and restart when INR between 2 and 3        Dyslipidemia- (present on admission)   Assessment & Plan    -Consider a statin        Alzheimer's disease- (present on admission)   Assessment & Plan    -Appears near his baseline, monitor            Reviewed items::  Labs reviewed and Medications reviewed  Lindsey catheter::  No Lindsey  DVT prophylaxis pharmacological::  Warfarin (Coumadin)  Ulcer Prophylaxis::  Not indicated

## 2017-11-09 NOTE — PROGRESS NOTES
Notified from monitor room that pt is in A-fib and has a HR of 140. Hospitalist has been paged. Pt has decreased back to HR of 82. No orders at this time. Will re-page hospitalist if pt is sustaining at 140.

## 2017-11-09 NOTE — PROGRESS NOTES
Patient to room 708-1. Patient alert x4, but is forgetful. Patient anxious and irritable. MP shows NSR VR 63. Pulses palpable. Patient denies pain. Dressing to left upper chest from previous pacemaker placement.Present POA. Patient has very dry/flaky/fragile skin. Lungs clear in upper lobes, diminished bibasilar. Call light in reach. Safety maintained.

## 2017-11-09 NOTE — ASSESSMENT & PLAN NOTE
Pacemaker does not appear to be malfunctioning  Could be secondary to dehydration  No recurrence  Cardiology evaluation in process

## 2017-11-09 NOTE — PROGRESS NOTES
Hospitalist paged due to Magnesium level of 1.5 and Potassium level of 3. Orders for medication received.

## 2017-11-09 NOTE — CARE PLAN
Problem: Safety  Goal: Will remain free from falls  Outcome: PROGRESSING AS EXPECTED  Pt was encouraged to use the call light when requesting assistance or before getting out of bed. Pt has been compliant with [facility] policy and has used call light for any needs.    Problem: Psychosocial Needs:  Goal: Level of anxiety will decrease  Outcome: PROGRESSING AS EXPECTED  Pt anxious about pacemaker and discussing with RN and CNA at bedside that he would like to see his MD as soon as possible once rounds start. Pt was reassured and reminded of the time (5:58am). Pt understands that he is in the hospital so medical staff can monitor his heart rhythm and assess if his pacemaker is working appropriately. Pt agrees with plan of care.

## 2017-11-09 NOTE — PROGRESS NOTES
Inpatient Anticoagulation Service Note    Date: 11/9/2017  Reason for Anticoagulation: Atrial Fibrillation  Hemoglobin Value: (!) 12.6  Hematocrit Value: (!) 34.8  Lab Platelet Value: 180  Target INR: 2.0 to 3.0  INR from last 7 days     Date/Time INR Value    11/08/17 1116 (!)  3.63        Dose from last 7 days     Date/Time Dose (mg)    11/09/17 0000  1        Average Dose (mg):  (Home dosing = 5 mg daily)  Significant Interactions: Statin      Comments: Therapy with warfarin continued from home for atrial fibrillation. Baseline INR supratherapeutic. Home dosing outlined above.    Plan:   In order to encourage downward INR trend while maintaining anticoagulation, will give reduced dose of 1 mg tonight. Will follow up on INR in order to determine further dosing requirements.     Education Material Provided?: No (Chronic warfarin therapy)    Pharmacist suggested discharge dosing: HENRY Huizar PharmD, BCPS

## 2017-11-09 NOTE — ASSESSMENT & PLAN NOTE
Cardiology consulted  Pacemaker interrogated  Will adjust settings depending on cardiology recommendations

## 2017-11-09 NOTE — DIETARY
Nutrition Services:  BMI <19 =(18.38)    Pt with low BMI.  Spoke with pt at bedside.  Pt appeared thin and elderly.  Pt reports no problems with appetite, but reports wt loss of 15 lbs in the past month.  Pt states that his UBW is ~  81kg (180 lbs).  Current wt is 74 kg (163lbs).  Wt loss of 9% in one month is severe.  Obtained meal preferences and added to pt daily menu.  Discussed supplements with pt and RN.  Will obtain order and send Boost Plus BID to ensure adequate intake.  Nutrition Representative to see pt for daily meal choices.    RD following.

## 2017-11-09 NOTE — PROGRESS NOTES
Cardiology Progress Note               Author: Cortez May Date & Time created: 11/8/2017  7:08 PM     Interval History:  Ppm implanted by me 2 days ago  He came back.  He thinks ppm not working as was told hr slow.  He is not sure what happened.  Last thing he remebers is eating breakfast    Device interogation shows stable parameters but does have epispsdes of RVR   but reports of hr in 30's on tele (tracings not available)    Review of Systems   Unable to perform ROS: Dementia   Psychiatric/Behavioral: Positive for memory loss.       Physical Exam   Constitutional: He is oriented to person, place, and time. He appears well-developed and well-nourished. No distress.   HENT:   Head: Normocephalic and atraumatic.   Right Ear: External ear normal.   Left Ear: External ear normal.   Nose: Nose normal.   Mouth/Throat: Oropharynx is clear and moist.   Eyes: Conjunctivae and EOM are normal. Pupils are equal, round, and reactive to light. Right eye exhibits no discharge. Left eye exhibits no discharge. No scleral icterus.   Neck: Normal range of motion. Neck supple. No JVD present. No tracheal deviation present.   Cardiovascular: Normal rate, regular rhythm, normal heart sounds and intact distal pulses.  Exam reveals no gallop and no friction rub.    No murmur heard.  Ppm site appears to be well healed   Pulmonary/Chest: Effort normal and breath sounds normal. No stridor. No respiratory distress. He has no wheezes. He has no rales. He exhibits no tenderness.   Abdominal: Soft. He exhibits no distension. There is no tenderness.   Musculoskeletal: He exhibits no edema or tenderness.   Neurological: He is alert and oriented to person, place, and time. No cranial nerve deficit. Coordination normal.   Skin: Skin is warm and dry. No rash noted. He is not diaphoretic. No erythema. No pallor.   Psychiatric: He has a normal mood and affect. His behavior is normal. Judgment and thought content normal.   Vitals  reviewed.      Hemodynamics:  Temp (24hrs), Av.9 °C (98.5 °F), Min:36.6 °C (97.9 °F), Max:37.2 °C (99 °F)  Temperature: 36.6 °C (97.9 °F)  Pulse  Av  Min: 63  Max: 65Heart Rate (Monitored): (!) 59  Blood Pressure : 148/78, NIBP: 127/70     Respiratory:    Respiration: 19, Pulse Oximetry: 97 %        RUL Breath Sounds: Clear, RML Breath Sounds: Diminished, RLL Breath Sounds: Diminished, LORETTA Breath Sounds: Clear, LLL Breath Sounds: Diminished  Fluids:  Date 17 07 - 17 0659   Shift [id number] 24 Hour Total   I  N  T  A  K  E   Shift Total       O  U  T  P  U  T   Urine  100  100    Shift Total  100  100   Weight (kg) 72.1 74 74 74       Weight: 74 kg (163 lb 2.3 oz)  GI/Nutrition:  Orders Placed This Encounter   Procedures   • Diet Order     Standing Status:   Standing     Number of Occurrences:   1     Order Specific Question:   Diet:     Answer:   Cardiac [6]     Lab Results:  Recent Labs      17   1116   WBC  7.0  7.2  8.8   RBC  3.92*  4.14*  3.84*   HEMOGLOBIN  12.8*  13.2*  12.6*   HEMATOCRIT  36.0*  37.5*  34.8*   MCV  91.8  90.6  90.6   MCH  32.7  31.9  32.8   MCHC  35.6*  35.2  36.2*   RDW  48.1  47.5  46.4   PLATELETCT  187  178  180   MPV  9.6  9.5  9.5     Recent Labs      17   1116   SODIUM  139  139  139   POTASSIUM  3.6  3.3*  3.6   CHLORIDE  106  106  105   CO2  23  27  26   GLUCOSE  107*  100*  134*   BUN  34*  26*  25*   CREATININE  1.36  1.05  1.27   CALCIUM  9.0  8.7  8.6     Recent Labs      17   1116   APTT  43.7*   --    --   49.7*   INR  2.80*  2.90*  3.70*  3.63*     Recent Labs      17   1116   BNPBTYPENAT  423*  540*     Recent Labs      17   1116   TROPONINI  0.02  0.06*   BNPBTYPENAT  423*  540*             Medical Decision Making, by Problem:  Active Hospital Problems     Diagnosis   • *Syncope [R55]   • Bradycardia [R00.1]   • Hypotension [I95.9]   • Chronic atrial fibrillation (CMS-HCC) [I48.2]   • HTN (hypertension) [I10]   • Dyslipidemia [E78.5]   • Alzheimer's disease [G30.9]   • Chronic anticoagulation [Z79.01]   • Elevated troponin [R74.8]       Plan:  Recurrent syncope    ? If he has failure to capture in setting of excellent device parameters that appear stable.  Definitely some rvr in device which is strange for his persistently low hr on presentation.    cxr looks stable so doubt effusion/tamponade physiology.  Can get limited echo to be sure if any more hypotension    Could consider pe but already on oac.    Will follow tele overnight and see if any failure to capture or apparent oversensing.     Quality-Core Measures

## 2017-11-09 NOTE — PROGRESS NOTES
Cardiology Progress Note               Author: Dayana Dinh Date & Time created: 11/9/2017  10:40 AM     Interval History:  Patient seen on EPS rounds.  Per Dr May consultative note:  Ppm implanted by me 2 days ago  He came back.  He thinks ppm not working as was told hr slow.  He is not sure what happened.  Last thing he remebers is eating breakfast     [facility] device interogation shows stable parameters but does have epispsdes of RVR   but reports of hr in 30's on tele (tracings not available)   HR of 30 bpm noted by Dr. Mercado in ER.  Impression:  Recurrent syncope  ? If he has failure to capture in setting of excellent device parameters that appear stable.  Definitely some rvr in device which is strange for his persistently low hr on presentation.  cxr looks stable so doubt effusion/tamponade physiology.  Can get limited echo to be sure if any more hypotension  Could consider pe but already on oac.  Will follow tele overnight and see if any failure to capture or apparent oversensing.        Review of Systems   Unable to perform ROS: Mental acuity       Physical Exam   Constitutional: He appears well-developed and well-nourished.   HENT:   Head: Normocephalic and atraumatic.   Eyes: Pupils are equal, round, and reactive to light.   Neck: Normal range of motion. Neck supple. No thyromegaly present.   Cardiovascular: Normal rate and regular rhythm.  Exam reveals no gallop and no friction rub.    Murmur heard.  Pulmonary/Chest: Effort normal and breath sounds normal. No respiratory distress. He has no wheezes. He has no rales.   Device site left chest Tegaderm dressing in place CDI.   Abdominal: Soft. Bowel sounds are normal. He exhibits no distension. There is no tenderness. There is no guarding.   Musculoskeletal: Normal range of motion. He exhibits no edema.   Neurological: He is alert.   Confused.   Skin: Skin is warm and dry.   Psychiatric:   Agitated and confused       Hemodynamics:  Temp (24hrs),  Av.6 °C (97.8 °F), Min:36.2 °C (97.1 °F), Max:37.2 °C (99 °F)  Temperature: 36.8 °C (98.2 °F)  Pulse  Av.9  Min: 60  Max: 140Heart Rate (Monitored): (!) 59  Blood Pressure : 138/81, NIBP: 127/70     Respiratory:    Respiration: 18, Pulse Oximetry: 97 %        RUL Breath Sounds: Diminished, RML Breath Sounds: Diminished, RLL Breath Sounds: Diminished, LORETTA Breath Sounds: Diminished, LLL Breath Sounds: Diminished  Fluids:     Weight: 74 kg (163 lb 2.3 oz)  GI/Nutrition:  Orders Placed This Encounter   Procedures   • Diet Order     Standing Status:   Standing     Number of Occurrences:   1     Order Specific Question:   Diet:     Answer:   Cardiac [6]     Lab Results:  Recent Labs      17   025   WBC  8.8  7.7   RBC  3.84*  3.77*   HEMOGLOBIN  12.6*  12.2*   HEMATOCRIT  34.8*  33.7*   MCV  90.6  89.4   MCH  32.8  32.4   MCHC  36.2*  36.2*   RDW  46.4  45.3   PLATELETCT  180  177   MPV  9.5  9.5     Recent Labs      17   025   SODIUM  139  139  138   POTASSIUM  3.3*  3.6  3.0*   CHLORIDE  106  105  106   CO2  27  26  24   GLUCOSE  100*  134*  95   BUN  26*  25*  29*   CREATININE  1.05  1.27  1.11   CALCIUM  8.7  8.6  8.3*     Recent Labs      17   0252   APTT   --   49.7*   --    INR  3.70*  3.63*  3.38*     Recent Labs      17   BNPBTYPENAT  540*     Recent Labs      17   TROPONINI  0.06*   BNPBTYPENAT  540*             Medical Decision Making, by Problem:  Active Hospital Problems    Diagnosis   • *Syncope [R55]   • Bradycardia [R00.1]   • Hypotension [I95.9]   • Chronic atrial fibrillation (CMS-HCC) [I48.2]   • HTN (hypertension) [I10]   • Dyslipidemia [E78.5]   • Alzheimer's disease [G30.9]   • DNR (do not resuscitate) [Z66]   • Chronic anticoagulation [Z79.01]   • Elevated troponin [R74.8]       Plan:  Overnight no pacemaker malfunction noted.  Rhythm has been paced.  Will have  manual interrogation performed.  Will add low dose Digoxin for AF with RVR noted on device interrogation.  Quality-Core Measures

## 2017-11-09 NOTE — PROGRESS NOTES
Bedside report received. Pt admitted for syncope and possible continued bradycardia regardless of recent pacemaker insertion. Syncope may be d/t med error at [facility], per son. Mg+ and K+ treated overnight. Hx of advanced Alzheimer's.  Alert to self and place, but needs reorientation to event and time. Mildly distressed over being here. Denies CP, SOB, and N&V, at this time. Continues partially paced over sinus rhythm on monitors. BP WNL. Bed low and locked, alarm on, call bell within reach. Tele.

## 2017-11-09 NOTE — THERAPY
"Physical Therapy Evaluation completed.   Bed Mobility:  Supine to Sit: Minimal Assist  Transfers: Sit to Stand: Moderate Assist (pt failed to scoot forward (despite cues) )  Gait: Level Of Assist: Supervised with Front-Wheel Walker       Plan of Care: Will benefit from Physical Therapy 3 times per week  Discharge Recommendations: Equipment: continue to assess.      See \"Rehab Therapy-Acute\" Patient Summary Report for complete documentation.     "

## 2017-11-09 NOTE — H&P
HOSPITAL MEDICINE HISTORY/ PHYSICAL    Date of Service:  11/8/2017   6:00 PM       Patient ID:   Name: Elton Olson. YOB: 1927. Age: 89 y.o. male. MRN: [id number]    Admitting Attending:  Narayan Benito     PCP : No primary care provider on file.          Chief Complaint:       Transfer from [facility] for lethargy and passing out    History of Present Illness:    Wesley is a 89 y.o. male w/h/o recent pacemaker insertion for bradycardia who presents with above chief complaint. Patient is somewhat a poor historian giving advanced Alzheimer's dementia. Patient was just recently admitted to this hospital where he had a pacemaker placed for sinus bradycardia and associated syncope. He appeared to tolerate this procedure well and was discharged back to [place] with clearance from physical therapy and occupational therapy. Apparently at his living facility he was found to be lethargic passing out and confused. EMS was called and the patient was brought here where he was found to have bradycardia down to the low 30s. The pacemaker is planned to be interrogated in cardiology in consultation. Patient denies any chest pain shortness of breath but just says nowhere passes out. He denies any premature symptoms. He'll might be a little bit dehydrated. Denies any nausea vomiting fevers or chills. Other than this the patient's history is somewhat limited as he does have advanced dementia.    Review of Systems:    Has Review of Systems   Unable to perform ROS: Dementia     Please see HPI, all other systems were reviewed and are negative (AMA/CMS criteria)              Past Medical/ Family / Social history (PFSH):   Past Medical History:   Diagnosis Date   • Arthritis    • Atrial fibrillation (CMS-HCC)    • Dementia    • Dyslipidemia      No past surgical history on file.  Current Outpatient Medications:  No current facility-administered medications on file prior to encounter.      Current Outpatient Prescriptions  "on File Prior to Encounter   Medication Sig Dispense Refill   • chlorthalidone (HYGROTON) 25 MG Tab Take 25 mg by mouth every day.     • meloxicam (MOBIC) 7.5 MG Tab Take 7.5 mg by mouth every evening.     • potassium chloride SA (KDUR) 20 MEQ Tab CR Take 20 mEq by mouth 2 times a day.     • warfarin (COUMADIN) 5 MG Tab Take 5 mg by mouth every evening.       Medication Allergy/Sensitivities:  No Known Allergies  Family History:  No family history on file.     Social History:  Social History   Substance Use Topics   • Smoking status: Unknown If Ever Smoked   • Smokeless tobacco: Never Used   • Alcohol use Yes      Comment: Vodka q day     #################################################################  Physical Exam:   Vitals/ General Appearance:   Weight/BMI: Body mass index is 18.38 kg/m².  Blood pressure 148/78, pulse 63, temperature 36.6 °C (97.9 °F), resp. rate 19, height 2.007 m (6' 7\"), weight 74 kg (163 lb 2.3 oz), SpO2 97 %.   Vitals:    11/08/17 1110 11/08/17 1215 11/08/17 1718   BP: (!) 99/58 117/63 148/78   Pulse: 65  63   Resp: 16  19   Temp: 37.2 °C (99 °F)  36.6 °C (97.9 °F)   SpO2: 99%  97%   Weight: 72.1 kg (159 lb)  74 kg (163 lb 2.3 oz)   Height:   2.007 m (6' 7\")    Oxygen Therapy:  Pulse Oximetry: 97 %, O2 (LPM): 0, O2 Delivery: None (Room Air)    Constitutional:  thin, frail  HENMT: Normocephalic, atraumatic, b/l ears normal, nose normal  Eyes:  EOMI, conjunctiva normal, no discharge  Neck: no tracheal deviation, supple  Cardiovascular: bradycardic, normal rhythm, no murmurs, no rubs or gallops; no cyanosis, clubbing or edema, PPM pocket with gauze, no fluctuance or pain appreciated  Lungs: Respiratory effort is normal, normal breath sounds, breath sounds clear to auscultation b/l, no rales, rhonchi or wheezing  Abdomen: soft, non-tender, no guarding or rebound  Skin: warm, dry, no erythema, no rash, multiple AK's throughout his body, xerosis  Neurologic: Alert and oriented X2, somewhat " confused, strength 5/5 throughout, no focal deficits, CN II-XII normal  Psychiatric: No anxiety or depression    #################################################################  Lab Data Review:    Objective   Recent Results (from the past 24 hour(s))   CBC with Differential    Collection Time: 11/08/17 11:16 AM   Result Value Ref Range    WBC 8.8 4.8 - 10.8 K/uL    RBC 3.84 (L) 4.70 - 6.10 M/uL    Hemoglobin 12.6 (L) 14.0 - 18.0 g/dL    Hematocrit 34.8 (L) 42.0 - 52.0 %    MCV 90.6 81.4 - 97.8 fL    MCH 32.8 27.0 - 33.0 pg    MCHC 36.2 (H) 33.7 - 35.3 g/dL    RDW 46.4 35.9 - 50.0 fL    Platelet Count 180 164 - 446 K/uL    MPV 9.5 9.0 - 12.9 fL    Neutrophils-Polys 84.20 (H) 44.00 - 72.00 %    Lymphocytes 9.70 (L) 22.00 - 41.00 %    Monocytes 5.20 0.00 - 13.40 %    Eosinophils 0.20 0.00 - 6.90 %    Basophils 0.20 0.00 - 1.80 %    Immature Granulocytes 0.50 0.00 - 0.90 %    Nucleated RBC 0.00 /100 WBC    Neutrophils (Absolute) 7.41 1.82 - 7.42 K/uL    Lymphs (Absolute) 0.85 (L) 1.00 - 4.80 K/uL    Monos (Absolute) 0.46 0.00 - 0.85 K/uL    Eos (Absolute) 0.02 0.00 - 0.51 K/uL    Baso (Absolute) 0.02 0.00 - 0.12 K/uL    Immature Granulocytes (abs) 0.04 0.00 - 0.11 K/uL    NRBC (Absolute) 0.00 K/uL   Complete Metabolic Panel (CMP)    Collection Time: 11/08/17 11:16 AM   Result Value Ref Range    Sodium 139 135 - 145 mmol/L    Potassium 3.6 3.6 - 5.5 mmol/L    Chloride 105 96 - 112 mmol/L    Co2 26 20 - 33 mmol/L    Anion Gap 8.0 0.0 - 11.9    Glucose 134 (H) 65 - 99 mg/dL    Bun 25 (H) 8 - 22 mg/dL    Creatinine 1.27 0.50 - 1.40 mg/dL    Calcium 8.6 8.5 - 10.5 mg/dL    AST(SGOT) 20 12 - 45 U/L    ALT(SGPT) 13 2 - 50 U/L    Alkaline Phosphatase 62 30 - 99 U/L    Total Bilirubin 1.9 (H) 0.1 - 1.5 mg/dL    Albumin 3.2 3.2 - 4.9 g/dL    Total Protein 6.1 6.0 - 8.2 g/dL    Globulin 2.9 1.9 - 3.5 g/dL    A-G Ratio 1.1 g/dL   Btype Natriuretic Peptide (BNP)    Collection Time: 11/08/17 11:16 AM   Result Value Ref Range    B  Natriuretic Peptide 540 (H) 0 - 100 pg/mL   Prothrombin Time (PT/INR)    Collection Time: 17 11:16 AM   Result Value Ref Range    PT 35.9 (H) 12.0 - 14.6 sec    INR 3.63 (H) 0.87 - 1.13   APTT    Collection Time: 17 11:16 AM   Result Value Ref Range    APTT 49.7 (H) 24.7 - 36.0 sec   Lipase    Collection Time: 17 11:16 AM   Result Value Ref Range    Lipase 14 11 - 82 U/L   Troponin STAT    Collection Time: 17 11:16 AM   Result Value Ref Range    Troponin I 0.06 (H) 0.00 - 0.04 ng/mL   LACTIC ACID    Collection Time: 17 11:16 AM   Result Value Ref Range    Lactic Acid 2.0 0.5 - 2.0 mmol/L   ESTIMATED GFR    Collection Time: 17 11:16 AM   Result Value Ref Range    GFR If African American >60 >60 mL/min/1.73 m 2    GFR If Non  53 (A) >60 mL/min/1.73 m 2   EKG (ER)    Collection Time: 17 11:37 AM   Result Value Ref Range    Report       [facility] Emergency Dept.    Test Date:  2017  Pt Name:    ROMMEL MARTINEZ                  Department: ER  MRN:        [id number]                      Room:       [facility]  Gender:     M                            Technician: [id number]  :        [id number]                   Requested By:OFELIA NIXON  Order #:    [id number]                    Reading MD: OFELIA NIXON MD    Measurements  Intervals                                Axis  Rate:       60                           P:          0  AL:         288                          QRS:        -90  QRSD:       128                          T:          -23  QT:         532  QTc:        532    Interpretive Statements  VENTRICULAR-PACED RHYTHM  NO FURTHER ANALYSIS ATTEMPTED DUE TO PACED RHYTHM  Compared to ECG 2017 03:41:12  Atrial fibrillation no longer present    Electronically Signed On 2017 14:17:11 PST by OFELIA NIXON MD     URINALYSIS    Collection Time: 17  1:41 PM   Result Value Ref Range    Color Yellow     Character Clear      Specific Gravity 1.014 <1.035    Ph 7.0 5.0 - 8.0    Glucose Negative Negative mg/dL    Ketones Negative Negative mg/dL    Protein 30 (A) Negative mg/dL    Bilirubin Negative Negative    Urobilinogen, Urine 1.0 Negative    Nitrite Negative Negative    Leukocyte Esterase Negative Negative    Occult Blood Negative Negative    Micro Urine Req Microscopic    URINE MICROSCOPIC (W/UA)    Collection Time: 11/08/17  1:41 PM   Result Value Ref Range    WBC 0-2 (A) /hpf    RBC 2-5 (A) /hpf    Bacteria Negative None /hpf    Epithelial Cells Negative /hpf    Hyaline Cast 0-2 /lpf         Imaging/Procedures Review:    DX-CHEST-PORTABLE (1 VIEW)   Final Result      No acute cardiopulmonary abnormality identified.        EKG:   per my independant read:  Ventricular paced rhythm heart rate 60 no evidence of clear ST segment changes    Assessment and Plan:      * Syncope- (present on admission)   Assessment & Plan    -Concerning for recurrent bradycardia and possible malfunctioning of the pacemaker see above        Hypotension- (present on admission)   Assessment & Plan    -Responded well to an IV fluid bolus appears slightly dehydrated on exam        Bradycardia- (present on admission)   Assessment & Plan    -Concerning given recent pacemaker placement and I personally observe the patient's heart rate going to the low 30s which could be causing syncopal episodes or [place]  -Interrogation of the pacemaker  -Cardiology has been consult and will follow along  -Monitor on telemetry  -Possible parameters need to be adjusted on pacemaker and chest x-ray shows appropriate lead placement and no fractured leads  -Avoid any AV denilson blocking agents        Chronic atrial fibrillation (CMS-HCC)- (present on admission)   Assessment & Plan    -No changes see above        HTN (hypertension)- (present on admission)   Assessment & Plan    -Appears well controlled        Elevated troponin- (present on admission)   Assessment & Plan    -Very mild  and no chest pain likely secondary to recent pacemaker placement no longer trend monitor for any arrhythmias on telemetry        Chronic anticoagulation- (present on admission)   Assessment & Plan    -INR is slightly supratherapeutic so hold Coumadin this evening and adjust Coumadin dosing per pharmacy, no evidence of overt bleeding and a hemoglobin and hematocrit are at baseline        Dyslipidemia- (present on admission)   Assessment & Plan    -Consider a statin        Alzheimer's disease- (present on admission)   Assessment & Plan    -Appears near his baseline, monitor              1. Prophylaxis: warfarin  2. Code: Full code per patient with himself present  3. Dispo: He will be admitted to inpatient for management that is expected to take greater than 2 midnights

## 2017-11-09 NOTE — CARE PLAN
Problem: Knowledge Deficit  Goal: Knowledge of disease process/condition, treatment plan, diagnostic tests, and medications will improve  Outcome: PROGRESSING SLOWER THAN EXPECTED  Alzheimer's making retention difficult. Son assists.    Problem: Psychosocial Needs:  Goal: Level of anxiety will decrease  Outcome: PROGRESSING SLOWER THAN EXPECTED  MD concerned over QTC and psych med interaction. Alternative methods have been used to some small effect.

## 2017-11-09 NOTE — THERAPY
"Occupational Therapy Evaluation completed.   Functional Status: Min A supine > EOB, mod A sit to stand, CGA pivot   Plan of Care: Will benefit from Occupational Therapy 3 times per week  Discharge Recommendations:  Equipment: Will Continue to Assess for Equipment Needs. Post-acute therapy TBD    See \"Rehab Therapy-Acute\" Patient Summary Report for complete documentation.    89 y.o. male admitted 11/5-11/7 for pacemaker placement. Pt discharged to [facility] where he had GLF and was readmitted on 11/8 with bradycardia. Cardiology assessing pacemaker. Pt seen now for OT katie. Presents with profoud anxiety, emotional lability regarding being hospitalized. Pt stating \"If you don't get me out of here today, I'm not going to make it!\" Pt requested to speak with son. OT placed call via  and pt had conversation with son, stating \"I'm desperate for you to come down here and get me out of here!\" OT engaged pt in transfer OOB to chair for breakfast. Pt much calmer once engaged and watching golf channel. Limited ADL and orientation assessment due to concern over escalation. Acute OT to follow to further assess and progress basic ADL.   "

## 2017-11-09 NOTE — PROGRESS NOTES
Inpatient Anticoagulation Service Note    Date: 11/9/2017  Reason for Anticoagulation: Atrial Fibrillation   [id number] VASc Score: 3    Hemoglobin Value: (!) 12.2  Hematocrit Value: (!) 33.7  Lab Platelet Value: 177  Target INR: 2.0 to 3.0    INR from last 7 days     Date/Time INR Value    11/09/17 0252 (!)  3.38    11/08/17 1116 (!)  3.63        Dose from last 7 days     Date/Time Dose (mg)    11/09/17 1100  2.5    11/09/17 0000  1        Average Dose (mg):  (Home dosing = 5 mg daily)  Significant Interactions: Statin     Comments: INR down a little after warfarin 1 mg dose last night. INR is still supratherapeutic. Will give warfarin 2.5 mg (1/2 daily dose at home) today and check the INR tomorrow. Once INR back in goal range, then will look to start scheduled warfarin dosing (pt's home warfarin dosing may be too high).       Education Material Provided?: No (Chronic warfarin therapy)    Pharmacist suggested discharge dosing: HENRY Tan, PharmD

## 2017-11-09 NOTE — PROGRESS NOTES
Report received at bedside, assumed care. Pt is resting in bed. A&O x3, reoriented to location in [place], [place]. No pain. Pt irritable and does not understand why he is in the hospital. Pt was educated on possible pacemaker malfunction and telemetry monitoring. Pt also upset that his dinner has not arrived. Kitchen has been notified. No other concerns, complains or distress. Tele box on. Chart reviewed. Bed in lowest position, treaded slipper sock on, and call light within reach. Will continue to monitor pt's heart rhythm and rate for abnormalities.

## 2017-11-09 NOTE — PROGRESS NOTES
Two RN skin check done with DIANE Rosales. Patient has abrasion above left eyebrow. Abrasion to right bottom lip. BUE dry/flaky. BLE dry and flaky. Bilateral feet ad in between all toes dry. Callous to ball of right foot.

## 2017-11-10 ENCOUNTER — PATIENT OUTREACH (OUTPATIENT)
Dept: HEALTH INFORMATION MANAGEMENT | Facility: OTHER | Age: 82
End: 2017-11-10

## 2017-11-10 VITALS
BODY MASS INDEX: 18.95 KG/M2 | RESPIRATION RATE: 18 BRPM | SYSTOLIC BLOOD PRESSURE: 125 MMHG | DIASTOLIC BLOOD PRESSURE: 66 MMHG | HEART RATE: 60 BPM | TEMPERATURE: 99.1 F | OXYGEN SATURATION: 96 % | HEIGHT: 78 IN | WEIGHT: 163.8 LBS

## 2017-11-10 PROBLEM — R55 SYNCOPE: Status: RESOLVED | Noted: 2017-11-05 | Resolved: 2017-11-10

## 2017-11-10 PROBLEM — R00.1 BRADYCARDIA: Status: RESOLVED | Noted: 2017-11-05 | Resolved: 2017-11-10

## 2017-11-10 PROBLEM — I95.9 HYPOTENSION: Status: RESOLVED | Noted: 2017-11-05 | Resolved: 2017-11-10

## 2017-11-10 LAB
ANION GAP SERPL CALC-SCNC: 9 MMOL/L (ref 0–11.9)
BACTERIA UR CULT: NORMAL
BUN SERPL-MCNC: 24 MG/DL (ref 8–22)
CALCIUM SERPL-MCNC: 8.7 MG/DL (ref 8.5–10.5)
CHLORIDE SERPL-SCNC: 104 MMOL/L (ref 96–112)
CO2 SERPL-SCNC: 26 MMOL/L (ref 20–33)
CREAT SERPL-MCNC: 0.99 MG/DL (ref 0.5–1.4)
GFR SERPL CREATININE-BSD FRML MDRD: >60 ML/MIN/1.73 M 2
GLUCOSE SERPL-MCNC: 103 MG/DL (ref 65–99)
INR PPP: 3.09 (ref 0.87–1.13)
POTASSIUM SERPL-SCNC: 3.3 MMOL/L (ref 3.6–5.5)
PROTHROMBIN TIME: 31.6 SEC (ref 12–14.6)
SIGNIFICANT IND 70042: NORMAL
SITE SITE: NORMAL
SODIUM SERPL-SCNC: 139 MMOL/L (ref 135–145)
SOURCE SOURCE: NORMAL

## 2017-11-10 PROCEDURE — A9270 NON-COVERED ITEM OR SERVICE: HCPCS | Performed by: INTERNAL MEDICINE

## 2017-11-10 PROCEDURE — 80048 BASIC METABOLIC PNL TOTAL CA: CPT

## 2017-11-10 PROCEDURE — 99239 HOSP IP/OBS DSCHRG MGMT >30: CPT | Performed by: INTERNAL MEDICINE

## 2017-11-10 PROCEDURE — 700102 HCHG RX REV CODE 250 W/ 637 OVERRIDE(OP): Performed by: INTERNAL MEDICINE

## 2017-11-10 PROCEDURE — 85610 PROTHROMBIN TIME: CPT

## 2017-11-10 PROCEDURE — 36415 COLL VENOUS BLD VENIPUNCTURE: CPT

## 2017-11-10 RX ORDER — DIGOXIN 125 MCG
125 TABLET ORAL DAILY
Qty: 30 TAB | Refills: 6 | Status: SHIPPED | OUTPATIENT
Start: 2017-11-10

## 2017-11-10 RX ORDER — DIGOXIN 125 MCG
125 TABLET ORAL DAILY
Qty: 30 TAB
Start: 2017-11-10 | End: 2017-11-10

## 2017-11-10 RX ORDER — DIGOXIN 125 MCG
125 TABLET ORAL DAILY
Qty: 30 TAB | Refills: 6 | Status: SHIPPED | OUTPATIENT
Start: 2017-11-10 | End: 2017-11-10

## 2017-11-10 RX ORDER — WARFARIN SODIUM 3 MG/1
1.5 TABLET ORAL
Status: DISCONTINUED | OUTPATIENT
Start: 2017-11-10 | End: 2017-11-10 | Stop reason: HOSPADM

## 2017-11-10 RX ORDER — ATORVASTATIN CALCIUM 40 MG/1
40 TABLET, FILM COATED ORAL DAILY
Qty: 30 TAB | Refills: 3 | Status: SHIPPED | OUTPATIENT
Start: 2017-11-10

## 2017-11-10 RX ADMIN — POTASSIUM CHLORIDE 40 MEQ: 1500 TABLET, EXTENDED RELEASE ORAL at 09:00

## 2017-11-10 ASSESSMENT — LIFESTYLE VARIABLES: EVER_SMOKED: NEVER

## 2017-11-10 NOTE — PROGRESS NOTES
Inpatient Anticoagulation Service Note    Date: 11/10/2017  Reason for Anticoagulation: Atrial Fibrillation   [id number] VASc Score: 3    Hemoglobin Value: (!) 12.2  Hematocrit Value: (!) 33.7  Lab Platelet Value: 177  Target INR: 2.0 to 3.0    INR from last 7 days     Date/Time INR Value    11/10/17 0239 (!)  3.09    11/09/17 0252 (!)  3.38    11/08/17 1116 (!)  3.63        Dose from last 7 days     Date/Time Dose (mg)    11/10/17 1000  1.5    11/09/17 1100  2.5    11/09/17 0000  1        Average Dose (mg):  (Home dosing = 5 mg daily)  Significant Interactions: Statin (If less than 5 days and overlap therapy discontinued -- document reason (i.e. Bleed Risk))    (If still on overlap therapy, if No -- document reason (i.e. Bleed Risk))    Comments:  INR supratherapeutic.   Plan:  Will continue reduced warfarin dosing until INR within goal range. Warfarin 1.5 mg po tonight  Education Material Provided?: No (Chronic warfarin therapy)  Pharmacist suggested discharge dosing: TBD     Raphael Aldana, PharmD BCPS

## 2017-11-10 NOTE — DISCHARGE PLANNING
RN notified SW that pt's son has agreed to transport pt back to [place]. SW notified [facility] to disregard transport request.

## 2017-11-10 NOTE — DISCHARGE PLANNING
Charge RN notified SW that pt is ready to return to [place] and will need transport. SW left message for the Wellness center at the [place]. Awaiting return call.

## 2017-11-10 NOTE — PROGRESS NOTES
Cardiology Progress Note               Author: Dayana Dinh Date & Time created: 11/10/2017  9:54 AM     Interval History:  Patient seen on EPS rounds.  Per Dr May consultative note:    Ppm implanted by me 2 days ago  He came back.  He thinks ppm not working as was told hr slow.  He is not sure what happened.  Last thing he remebers is eating breakfast     Device interogation shows stable parameters but does have epispsdes of RVR   but reports of hr in 30's on tele (tracings not available)     Recurrent syncope     ? If he has failure to capture in setting of excellent device parameters that appear stable.  Definitely some rvr in device which is strange for his persistently low hr on presentation.     cxr looks stable so doubt effusion/tamponade physiology.  Can get limited echo to be sure if any more hypotension     Could consider pe but already on oac.     Will follow tele overnight and see if any failure to capture or apparent oversensing.   Interrogation of device intact.  NO evidence of pacemaker malfunction.  ROS    Physical Exam   Constitutional: He appears well-developed and well-nourished.   HENT:   Head: Normocephalic and atraumatic.   Eyes: Pupils are equal, round, and reactive to light. Right eye exhibits discharge. Left eye exhibits discharge.   Neck: Normal range of motion. Neck supple. No thyromegaly present.   Cardiovascular: Normal rate and regular rhythm.  Exam reveals no gallop and no friction rub.    No murmur heard.  Pulmonary/Chest: Effort normal and breath sounds normal. No respiratory distress. He has no wheezes. He has no rales.   Pacemaker site intact.   Abdominal: Soft. Bowel sounds are normal. He exhibits no distension. There is no tenderness. There is no guarding.   Musculoskeletal: Normal range of motion. He exhibits no edema.   Neurological: He is alert.   Skin: Skin is warm and dry.   Psychiatric: He has a normal mood and affect.       Hemodynamics:  Temp (24hrs), Av.8 °C  (98.2 °F), Min:36.4 °C (97.5 °F), Max:37.2 °C (99 °F)  Temperature: 36.9 °C (98.4 °F)  Pulse  Av.2  Min: 59  Max: 140  Blood Pressure : 155/71     Respiratory:    Respiration: 18, Pulse Oximetry: 95 %        RUL Breath Sounds: Diminished, RML Breath Sounds: Diminished, RLL Breath Sounds: Diminished, LORETTA Breath Sounds: Diminished, LLL Breath Sounds: Diminished  Fluids:     Weight: 74.3 kg (163 lb 12.8 oz)  GI/Nutrition:  Orders Placed This Encounter   Procedures   • Diet Order     Standing Status:   Standing     Number of Occurrences:   1     Order Specific Question:   Diet:     Answer:   Cardiac [6]     Lab Results:  Recent Labs      17   11117   025   WBC  8.8  7.7   RBC  3.84*  3.77*   HEMOGLOBIN  12.6*  12.2*   HEMATOCRIT  34.8*  33.7*   MCV  90.6  89.4   MCH  32.8  32.4   MCHC  36.2*  36.2*   RDW  46.4  45.3   PLATELETCT  180  177   MPV  9.5  9.5     Recent Labs      17   0252  11/10/17   0239   SODIUM  139  138  139   POTASSIUM  3.6  3.0*  3.3*   CHLORIDE  105  106  104   CO2  26  24  26   GLUCOSE  134*  95  103*   BUN  25*  29*  24*   CREATININE  1.27  1.11  0.99   CALCIUM  8.6  8.3*  8.7     Recent Labs      17   0252  11/10/17   0239   APTT  49.7*   --    --    INR  3.63*  3.38*  3.09*     Recent Labs      17   111   BNPBTYPENAT  540*     Recent Labs      17   1116   TROPONINI  0.06*   BNPBTYPENAT  540*             Medical Decision Making, by Problem:  Active Hospital Problems    Diagnosis   • Chronic atrial fibrillation (CMS-HCC) [I48.2]   • HTN (hypertension) [I10]   • Dyslipidemia [E78.5]   • Alzheimer's disease [G30.9]   • DNR (do not resuscitate) [Z66]   • Severe protein-calorie malnutrition (CMS-HCC) [E43]   • Chronic anticoagulation [Z79.01]   • Elevated troponin [R74.8]       Plan:  Interrogation of device intact.  NO evidence of pacemaker malfunction.  Follow up in pacemaker clinic arranged in 5 weeks.  EPS will sign  off.    Quality-Core Measures

## 2017-11-10 NOTE — PROGRESS NOTES
Bedside report received. Per Cardiology, Pt is to DC, today. Pacer checked and found to be WNL and functioning as expected. Pt should return to [place]. POA requested MD call son-in-law with update. AOx2, mildly anxious as he wants to leave. Denies CP, SOB, and N&V. Bed low and locked, alarm on, call bell within reach. Tele.

## 2017-11-10 NOTE — DISCHARGE INSTRUCTIONS
Discharge Instructions    Discharged to group home by car with relative. Discharged via wheelchair, hospital escort: Yes.  Special equipment needed: Not Applicable    Be sure to schedule a follow-up appointment with your primary care doctor or any specialists as instructed.     Discharge Plan:   Diet Plan: Discussed  Activity Level: Discussed  Confirmed Follow up Appointment: Appointment Scheduled  Confirmed Symptoms Management: Discussed  Medication Reconciliation Updated: Yes  Influenza Vaccine Indication: Patient Refuses    I understand that a diet low in cholesterol, fat, and sodium is recommended for good health. Unless I have been given specific instructions below for another diet, I accept this instruction as my diet prescription.   Other diet: heart healthy, or as otherwise prescribed.    Special Instructions: None    · Is patient discharged on Warfarin / Coumadin?   Yes    You are receiving the drug warfarin. Please understand the importance of monitoring warfarin with scheduled PT/INR blood draws.  Follow-up with a call to your personal Doctor's office in 3 days to schedule a PT/INR. .    IMPORTANT: HOW TO USE THIS INFORMATION:  This is a summary and does NOT have all possible information about this product. This information does not assure that this product is safe, effective, or appropriate for you. This information is not individual medical advice and does not substitute for the advice of your health care professional. Always ask your health care professional for complete information about this product and your specific health needs.      WARFARIN - ORAL (WARF-uh-rin)      COMMON BRAND NAME(S): Coumadin      WARNING:  Warfarin can cause very serious (possibly fatal) bleeding. This is more likely to occur when you first start taking this medication or if you take too much warfarin. To decrease your risk for bleeding, your doctor or other health care provider will monitor you closely and check your lab  "results (INR test) to make sure you are not taking too much warfarin. Keep all medical and laboratory appointments. Tell your doctor right away if you notice any signs of serious bleeding. See also Side Effects section.      USES:  This medication is used to treat blood clots (such as in deep vein thrombosis-DVT or pulmonary embolus-PE) and/or to prevent new clots from forming in your body. Preventing harmful blood clots helps to reduce the risk of a stroke or heart attack. Conditions that increase your risk of developing blood clots include a certain type of irregular heart rhythm (atrial fibrillation), heart valve replacement, recent heart attack, and certain surgeries (such as hip/knee replacement). Warfarin is commonly called a \"blood thinner,\" but the more correct term is \"anticoagulant.\" It helps to keep blood flowing smoothly in your body by decreasing the amount of certain substances (clotting proteins) in your blood.      HOW TO USE:  Read the Medication Guide provided by your pharmacist before you start taking warfarin and each time you get a refill. If you have any questions, ask your doctor or pharmacist. Take this medication by mouth with or without food as directed by your doctor or other health care professional, usually once a day. It is very important to take it exactly as directed. Do not increase the dose, take it more frequently, or stop using it unless directed by your doctor. Dosage is based on your medical condition, laboratory tests (such as INR), and response to treatment. Your doctor or other health care provider will monitor you closely while you are taking this medication to determine the right dose for you. Use this medication regularly to get the most benefit from it. To help you remember, take it at the same time each day. It is important to eat a balanced, consistent diet while taking warfarin. Some foods can affect how warfarin works in your body and may affect your treatment and " dose. Avoid sudden large increases or decreases in your intake of foods high in vitamin K (such as broccoli, cauliflower, cabbage, brussels sprouts, kale, spinach, and other green leafy vegetables, liver, green tea, certain vitamin supplements). If you are trying to lose weight, check with your doctor before you try to go on a diet. Cranberry products may also affect how your warfarin works. Limit the amount of cranberry juice (16 ounces/480 milliliters a day) or other cranberry products you may drink or eat.      SIDE EFFECTS:  Nausea, loss of appetite, or stomach/abdominal pain may occur. If any of these effects persist or worsen, tell your doctor or pharmacist promptly. Remember that your doctor has prescribed this medication because he or she has judged that the benefit to you is greater than the risk of side effects. Many people using this medication do not have serious side effects. This medication can cause serious bleeding if it affects your blood clotting proteins too much (shown by unusually high INR lab results). Even if your doctor stops your medication, this risk of bleeding can continue for up to a week. Tell your doctor right away if you have any signs of serious bleeding, including: unusual pain/swelling/discomfort, unusual/easy bruising, prolonged bleeding from cuts or gums, persistent/frequent nosebleeds, unusually heavy/prolonged menstrual flow, pink/dark urine, coughing up blood, vomit that is bloody or looks like coffee grounds, severe headache, dizziness/fainting, unusual or persistent tiredness/weakness, bloody/black/tarry stools, chest pain, shortness of breath, difficulty swallowing. Tell your doctor right away if any of these unlikely but serious side effects occur: persistent nausea/vomiting, severe stomach/abdominal pain, yellowing eyes/skin. This drug rarely has caused very serious (possibly fatal) problems if its effects lead to small blood clots (usually at the beginning of treatment).  This can lead to severe skin/tissue damage that may require surgery or amputation if left untreated. Patients with certain blood conditions (protein C or S deficiency) may be at greater risk. Get medical help right away if any of these rare but serious side effects occur: painful/red/purplish patches on the skin (such as on the toe, breast, abdomen), change in the amount of urine, vision changes, confusion, slurred speech, weakness on one side of the body. A very serious allergic reaction to this drug is rare. However, get medical help right away if you notice any symptoms of a serious allergic reaction, including: rash, itching/swelling (especially of the face/tongue/throat), severe dizziness, trouble breathing. This is not a complete list of possible side effects. If you notice other effects not listed above, contact your doctor or pharmacist. In the [place] - Call your doctor for medical advice about side effects. You may report side effects to FDA at 9-753-RUI-7698. In [place] - Call your doctor for medical advice about side effects. You may report side effects to [facility] [place] at [phone].      PRECAUTIONS:  Before taking warfarin, tell your doctor or pharmacist if you are allergic to it; or if you have any other allergies. This product may contain inactive ingredients, which can cause allergic reactions or other problems. Talk to your pharmacist for more details. Before using this medication, tell your doctor or pharmacist your medical history, especially of: blood disorders (such as anemia, hemophilia), bleeding problems (such as bleeding of the stomach/intestines, bleeding in the brain), blood vessel disorders (such as aneurysms), recent major injury/surgery, liver disease, alcohol use, mental/mood disorders (including memory problems), frequent falls/injuries. It is important that all your doctors and dentists know that you take warfarin. Before having surgery or any medical/dental procedures, tell your  doctor or dentist that you are taking this medication and about all the products you use (including prescription drugs, nonprescription drugs, and herbal products). Avoid getting injections into the muscles. If you must have an injection into a muscle (for example, a flu shot), it should be given in the arm. This way, it will be easier to check for bleeding and/or apply pressure bandages. This medication may cause stomach bleeding. Daily use of alcohol while using this medicine will increase your risk for stomach bleeding and may also affect how this medication works. Limit or avoid alcoholic beverages. If you have not been eating well, if you have an illness or infection that causes fever, vomiting, or diarrhea for more than 2 days, or if you start using any antibiotic medications, contact your doctor or pharmacist immediately because these conditions can affect how warfarin works. This medication can cause heavy bleeding. To lower the chance of getting cut, bruised, or injured, use great caution with sharp objects like safety razors and nail cutters. Use an electric razor when shaving and a soft toothbrush when brushing your teeth. Avoid activities such as contact sports. If you fall or injure yourself, especially if you hit your head, call your doctor immediately. Your doctor may need to check you. The Food & Drug Administration has stated that generic warfarin products are interchangeable. However, consult your doctor or pharmacist before switching warfarin products. Be careful not to take more than one medication that contains warfarin unless specifically directed by the doctor or health care provider who is monitoring your warfarin treatment. Older adults may be at greater risk for bleeding while using this drug. This medication is not recommended for use during pregnancy because of serious (possibly fatal) harm to an unborn baby. Discuss the use of reliable forms of birth control with your doctor. If you  "become pregnant or think you may be pregnant, tell your doctor immediately. If you are planning pregnancy, discuss a plan for managing your condition with your doctor before you become pregnant. Your doctor may switch the type of medication you use during pregnancy. Very small amounts of this medication may pass into breast milk but is unlikely to harm a nursing infant. Consult your doctor before breast-feeding.      DRUG INTERACTIONS:  Drug interactions may change how your medications work or increase your risk for serious side effects. This document does not contain all possible drug interactions. Keep a list of all the products you use (including prescription/nonprescription drugs and herbal products) and share it with your doctor and pharmacist. Do not start, stop, or change the dosage of any medicines without your doctor's approval. Warfarin interacts with many prescription, nonprescription, vitamin, and herbal products. This includes medications that are applied to the skin or inside the vagina or rectum. The interactions with warfarin usually result in an increase or decrease in the \"blood-thinning\" (anticoagulant) effect. Your doctor or other health care professional should closely monitor you to prevent serious bleeding or clotting problems. While taking warfarin, it is very important to tell your doctor or pharmacist of any changes in medications, vitamins, or herbal products that you are taking. Some products that may interact with this drug include: capecitabine, imatinib, mifepristone. Aspirin, aspirin-like drugs (salicylates), and nonsteroidal anti-inflammatory drugs (NSAIDs such as ibuprofen, naproxen, celecoxib) may have effects similar to warfarin. These drugs may increase the risk of bleeding problems if taken during treatment with warfarin. Carefully check all prescription/nonprescription product labels (including drugs applied to the skin such as pain-relieving creams) since the products may " contain NSAIDs or salicylates. Talk to your doctor about using a different medication (such as acetaminophen) to treat pain/fever. Low-dose aspirin and related drugs (such as clopidogrel, ticlopidine) should be continued if prescribed by your doctor for specific medical reasons such as heart attack or stroke prevention. Consult your doctor or pharmacist for more details. Many herbal products interact with warfarin. Tell your doctor before taking any herbal products, especially bromelains, coenzyme Q10, cranberry, danshen, dong quai, fenugreek, garlic, ginkgo biloba, ginseng, and [place]'s wort, among others. This medication may interfere with a certain laboratory test to measure theophylline levels, possibly causing false test results. Make sure laboratory personnel and all your doctors know you use this drug.      OVERDOSE:  If overdose is suspected, contact a poison control center or emergency room immediately. [place] residents can call the [place] Poison Hotline at 1-552.809.4701. [place] residents can call a provincial poison control center. Symptoms of overdose may include: bloody/black/tarry stools, pink/dark urine, unusual/prolonged bleeding.      NOTES:  Do not share this medication with others. Laboratory and/or medical tests (such as INR, complete blood count) must be performed periodically to monitor your progress or check for side effects. Consult your doctor for more details.      MISSED DOSE:  For the best possible benefit, do not miss any doses. If you do miss a dose and remember on the same day, take it as soon as you remember. If you remember on the next day, skip the missed dose and resume your usual dosing schedule. Do not double the dose to catch up because this could increase your risk for bleeding. Keep a record of missed doses to give to your doctor or pharmacist. Contact your doctor or pharmacist if you miss 2 or more doses in a row.      STORAGE:  Store at room temperature away from light  and moisture. Do not store in the bathroom. Keep all medications away from children and pets. Do not flush medications down the toilet or pour them into a drain unless instructed to do so. Properly discard this product when it is  or no longer needed. Consult your pharmacist or local waste disposal company for more details about how to safely discard your product.      MEDICAL ALERT:  Your condition and medication can cause complications in a medical emergency. For information about enrolling in MedicAlert, call 1-575.858.4736 ([place]) or 1-766.599.3615 ([place]).      Information last revised 2010 Copyright(c)  [employer]             · Is patient Post Blood Transfusion?  No        Syncope  Syncope is a medical term for fainting or passing out. This means you lose consciousness and drop to the ground. People are generally unconscious for less than 5 minutes. You may have some muscle twitches for up to 15 seconds before waking up and returning to normal. Syncope occurs more often in older adults, but it can happen to anyone. While most causes of syncope are not dangerous, syncope can be a sign of a serious medical problem. It is important to seek medical care.   CAUSES   Syncope is caused by a sudden drop in blood flow to the brain. The specific cause is often not determined. Factors that can bring on syncope include:  · Taking medicines that lower blood pressure.  · Sudden changes in posture, such as standing up quickly.  · Taking more medicine than prescribed.  · Standing in one place for too long.  · Seizure disorders.  · Dehydration and excessive exposure to heat.  · Low blood sugar (hypoglycemia).  · Straining to have a bowel movement.  · Heart disease, irregular heartbeat, or other circulatory problems.  · Fear, emotional distress, seeing blood, or severe pain.  SYMPTOMS   Right before fainting, you may:  · Feel dizzy or light-headed.  · Feel nauseous.  · See all white or all black in  your field of vision.  · Have cold, clammy skin.  DIAGNOSIS   Your health care provider will ask about your symptoms, perform a physical exam, and perform an electrocardiogram (ECG) to record the electrical activity of your heart. Your health care provider may also perform other heart or blood tests to determine the cause of your syncope which may include:  · Transthoracic echocardiogram (TTE). During echocardiography, sound waves are used to evaluate how blood flows through your heart.  · Transesophageal echocardiogram (KRYSTINA).  · Cardiac monitoring. This allows your health care provider to monitor your heart rate and rhythm in real time.  · Holter monitor. This is a portable device that records your heartbeat and can help diagnose heart arrhythmias. It allows your health care provider to track your heart activity for several days, if needed.  · Stress tests by exercise or by giving medicine that makes the heart beat faster.  TREATMENT   In most cases, no treatment is needed. Depending on the cause of your syncope, your health care provider may recommend changing or stopping some of your medicines.  HOME CARE INSTRUCTIONS  · Have someone stay with you until you feel stable.  · Do not drive, use machinery, or play sports until your health care provider says it is okay.  · Keep all follow-up appointments as directed by your health care provider.  · Lie down right away if you start feeling like you might faint. Breathe deeply and steadily. Wait until all the symptoms have passed.  · Drink enough fluids to keep your urine clear or pale yellow.  · If you are taking blood pressure or heart medicine, get up slowly and take several minutes to sit and then stand. This can reduce dizziness.  SEEK IMMEDIATE MEDICAL CARE IF:   · You have a severe headache.  · You have unusual pain in the chest, abdomen, or back.  · You are bleeding from your mouth or rectum, or you have black or tarry stool.  · You have an irregular or very fast  heartbeat.  · You have pain with breathing.  · You have repeated fainting or seizure-like jerking during an episode.  · You faint when sitting or lying down.  · You have confusion.  · You have trouble walking.  · You have severe weakness.  · You have vision problems.  If you fainted, call your local emergency services (911 in [place] Do not drive yourself to the hospital.      This information is not intended to replace advice given to you by your health care provider. Make sure you discuss any questions you have with your health care provider.     Document Released: 12/18/2006 Document Revised: 05/03/2016 Document Reviewed: 02/15/2013  [employer] Patient Education ©2016 [employer]  Pacemaker Implantation, Care After  Refer to this sheet over the next few weeks. These instructions provide you with information on caring for yourself after the procedure. Your health care provider may also give you more specific instructions. Your treatment has been planned according to current medical practices, but problems sometimes occur. Call your health care provider if you have any problems or questions regarding your pacemaker.   WHAT TO EXPECT AFTER THE PROCEDURE  · You may feel pain. Some pain is normal. It may last a few days.  · A slight bump may be seen over the skin where the device was placed. Sometimes, it is possible to feel the device under the skin. This is normal.  · In the months and years afterward, your health care provider will check the device, the leads, and the battery every few months. Eventually, when the battery is low, the device will be replaced.  HOME CARE INSTRUCTIONS  Medicines  · Take medicines only as directed by your health care provider.  · If you were prescribed an antibiotic medicine, finish it all even if you start to feel better.  · Do not take any other medicines without asking your health care provider first. Some medicines, including certain painkillers, can cause bleeding in  your stomach after surgery.  Wound Care  · Do not remove the bandage on your chest until directed to do so by your health care provider.  · After your bandage is removed, you may see pieces of tape called skin adhesive strips over the area where the cut was made (incision site). Let them fall off on their own.  · Check the incision site every day to make sure it is not infected, bleeding, or starting to pull apart.  · Do not use lotions or ointments near the incision site unless directed to do so.  · Keep the incision area clean and dry for 2-3 days after the procedure or as directed by your health care provider. It takes several weeks for the incision site to completely heal.  · Do not take baths, swim, or use a hot tub until your health care provider approves.  Activities  · Try to walk a little every day. Exercising is important after this procedure. It is also important to use your shoulder on the side of the pacemaker in daily tasks that do not require exaggerated motion.  · Avoid sudden jerking, pulling, or chopping movements that pull your upper arm far away from your body for at least 6 weeks.  · Do not lift your upper arm above your shoulders for at least 6 weeks. This means no tennis, golf, or swimming for this period of time. If you sleep with the arm above your head, use a restraint to prevent this from happening as you sleep.  · You may go back to work when your health care provider says it is okay. Check with your health care provider before you start to drive or play sports.  Other Instructions  · Follow diet instructions if they were provided. You should be able to eat what you usually do right away, but you may need to limit your salt intake.  · Weigh yourself every day. If you suddenly gain weight, fluid may be building up in your body.  · Always carry your pacemaker identification card with you. The card should list the implant date, device model, and . Consider wearing a medical alert  bracelet or necklace.  · Tell all health care providers that you have a pacemaker. This may prevent them from giving you a magnetic resource imaging scan (MRI) because of the strong magnets used during that test.  · If you must pass through a metal detector, quickly walk through it. Do not stop under the detector or stand near it.  · Avoid places or objects with a strong electric or magnetic field, including:  ¨ Airport security vogel. When at the airport, let officials know you have a pacemaker. Your ID card will let you be checked in a way that is safe for you and that will not damage your pacemaker. Also, do not let a security person wave a magnetic wand near your pacemaker. That can make it stop working.  ¨ Power plants.  ¨ Large electrical generators.  ¨ Radiofrequency transmission towers, such as cell phone and radio towers.  · Do not use amateur (ham) radio equipment or electric (arc) welding torches. Some devices are safe to use if held at least 1 foot from your pacemaker. These include power tools, lawn mowers, and speakers. If you are unsure of whether something is safe to use, ask your health care provider.  · You may safely use electric blankets, heating pads, computers, and microwave ovens.  · When using your cell phone, hold it to the ear opposite the pacemaker. Do not leave your cell phone in a pocket over the pacemaker.  · Keep all follow-up visits as directed by your health care provider. This is how your health care provider makes sure your chest is healing the way it should. Ask your health care provider when you should come back to have your stitches or staples taken out.  · Have your pacemaker checked every 3-6 months or as directed by your health care provider. Most pacemakers last for 4-8 years before a new one is needed.  SEEK MEDICAL CARE IF:  · You gain weight suddenly.  · Your legs or feet swell more than they have before.  · It feels like your heart is fluttering or skipping beats (heart  palpitations).  · You have a fever.  SEEK IMMEDIATE MEDICAL CARE IF:  · You have chest pain.  · You feel more short of breath than you have felt before.  · You feel more light-headed than you have felt before.  · You have problems with your incision site, such as swelling or bleeding, or it starts to open up.  · You have drainage, redness, swelling, or pain at your incision site.     This information is not intended to replace advice given to you by your health care provider. Make sure you discuss any questions you have with your health care provider.     Document Released: 07/07/2006 Document Revised: 01/08/2016 Document Reviewed: 04/19/2013  [employer] Interactive Patient Education ©2016 [employer]      Depression / Suicide Risk    As you are discharged from this [facility] facility, it is important to learn how to keep safe from harming yourself.    Recognize the warning signs:  · Abrupt changes in personality, positive or negative- including increase in energy   · Giving away possessions  · Change in eating patterns- significant weight changes-  positive or negative  · Change in sleeping patterns- unable to sleep or sleeping all the time   · Unwillingness or inability to communicate  · Depression  · Unusual sadness, discouragement and loneliness  · Talk of wanting to die  · Neglect of personal appearance   · Rebelliousness- reckless behavior  · Withdrawal from people/activities they love  · Confusion- inability to concentrate     If you or a loved one observes any of these behaviors or has concerns about self-harm, here's what you can do:  · Talk about it- your feelings and reasons for harming yourself  · Remove any means that you might use to hurt yourself (examples: pills, rope, extension cords, firearm)  · Get professional help from the community (Mental Health, Substance Abuse, psychological counseling)  · Do not be alone:Call your Safe Contact- someone whom you trust who will be there for you.  · Call  your local CRISIS HOTLINE 956-8074 or 012-533-0328  · Call your local Children's Mobile Crisis Response Team [place] (845) 503-1599 or www.[employer]  · Call the toll free National Suicide Prevention Hotlines   · National Suicide Prevention Lifeline 780-944-UIQP (2247)  · National Hope Line Network 800-SUICIDE (043-7655)

## 2017-11-10 NOTE — DISCHARGE SUMMARY
CHIEF COMPLAINT ON ADMISSION  Chief Complaint   Patient presents with   • Hypotension       CODE STATUS  DNR    HPI & HOSPITAL COURSE  This is a 89 y.o. male here with syncope. He was found to be dehydrated and there was concern that his pacemaker may not be functioning properly. He had pacemaker interrogation showing proper function. Cardiology was consulted and agreed with hydration and no farther cardiac intervention. He was treated with iv fluids and his creatinine improved from 1.27 to 0.99. He had no recurrent syncopal events.  [facility].  Digoxin was started and he will need a blood test to check his digoxin level in 2 weeks.     Therefore, he is discharged in fair and stable condition with close outpatient follow-up.    SPECIFIC OUTPATIENT FOLLOW-UP  Cardiology in 2-4 weeks  Digoxin level check in 2 weeks    DISCHARGE PROBLEM LIST  Principal Problem (Resolved):    Syncope POA: Yes  Active Problems:    HTN (hypertension) POA: Yes    Chronic atrial fibrillation (CMS-HCC) POA: Yes    Alzheimer's disease POA: Yes    Dyslipidemia POA: Yes    Chronic anticoagulation POA: Yes    Elevated troponin POA: Yes    DNR (do not resuscitate) POA: Unknown    Severe protein-calorie malnutrition (CMS-HCC) POA: Unknown  Resolved Problems:    Bradycardia POA: Yes    Hypotension POA: Yes      FOLLOW UP  Future Appointments  Date Time Provider Department Center   11/13/2017 10:45 AM PACER CHECK-[facility] B 2 [facility] None     [facility]            MEDICATIONS ON DISCHARGE   Elton Olson   Home Medication Instructions LEONID:[id number]    Printed on:11/10/17 0841   Medication Information                      atorvastatin (LIPITOR) 40 MG Tab  Take 40 mg by mouth every evening.             chlorthalidone (HYGROTON) 25 MG Tab  Take 25 mg by mouth every day.                          digoxin (LANOXIN) 125 MCG Tab  Take 1 Tab by mouth every day at 6 PM.             meloxicam (MOBIC) 7.5  MG Tab  Take 7.5 mg by mouth every evening.             potassium chloride SA (KDUR) 20 MEQ Tab CR  Take 20 mEq by mouth 2 times a day.             warfarin (COUMADIN) 5 MG Tab  Take 5 mg by mouth every evening.                 DIET  Orders Placed This Encounter   Procedures   • Diet Order     Standing Status:   Standing     Number of Occurrences:   1     Order Specific Question:   Diet:     Answer:   Cardiac [6]       ACTIVITY  As tolerated and directed by skilled nursing.  Weight bearing as tolerated      CONSULTATIONS  Cardiology Dr. May    PROCEDURES  Pacemaker interrogation showing proper pacemaker function    Echocardiogram shows Normal left ventricular size and systolic function. Normal regional   wall motion. Left ventricular ejection fraction is visually estimated   to be 55%. A reliable estimation of diastolic function cannot be made   due to conflicting data. Bradycardia 40 bpm.  Mild concentric left   ventricular hypertrophy.  Severe tricuspid regurgitation. Estimated right ventricular systolic   pressure  is 45 mmHg.    LABORATORY  Lab Results   Component Value Date/Time    SODIUM 139 11/10/2017 02:39 AM    POTASSIUM 3.3 (L) 11/10/2017 02:39 AM    CHLORIDE 104 11/10/2017 02:39 AM    CO2 26 11/10/2017 02:39 AM    GLUCOSE 103 (H) 11/10/2017 02:39 AM    BUN 24 (H) 11/10/2017 02:39 AM    CREATININE 0.99 11/10/2017 02:39 AM        Lab Results   Component Value Date/Time    WBC 7.7 11/09/2017 02:52 AM    HEMOGLOBIN 12.2 (L) 11/09/2017 02:52 AM    HEMATOCRIT 33.7 (L) 11/09/2017 02:52 AM    PLATELETCT 177 11/09/2017 02:52 AM        Total time of the discharge process exceeds 45 minutes

## 2017-11-10 NOTE — PROGRESS NOTES
Report received at bedside, assumed care. Pt is resting in bed. A&O x3. Pain at 0/10. No other concerns, complains or distress. Tele box on. Chart reviewed. Bed in lowest position, treaded slipper sock on, and call light within reach. Will continue to monitor for abnormalities in heart rhythm or heart rate. Pt was reminded to use the call light before getting out of bed or when needing assistance.

## 2017-11-10 NOTE — PROGRESS NOTES
Pt was yelling out of room for staff assistance for the urinal. Pt was reminded of his call light button. Pt showed appropriate use of call light after reminder.

## 2017-11-10 NOTE — DISCHARGE PLANNING
NOELLE called [facility] again, spoke to Lisy with [facility]. She repots they do not have transport available due to the holiday weekend. Transport request form emailed to [facility] to request [facility] van transport.

## 2017-11-10 NOTE — CARE PLAN
"Problem: Communication  Goal: The ability to communicate needs accurately and effectively will improve  Outcome: PROGRESSING AS EXPECTED  Pt has been reminded throughout the night to use the call light to alert the staff of needs instead of yelling out of the door. Pt understands. Pt also has been reoriented to the indication for his hospital stay which is pacemaker interrogation. Pt states \"I am 89 years old, and I have never needed this. I have not needed this.\" Pt anxiety levels were attempted to be reduced by giving a pt warm blanket and teaching pt how to use the television remote.     Problem: Safety  Goal: Will remain free from falls  Outcome: PROGRESSING AS EXPECTED  Pt's risk factor for falls are his age and previous history of syncopal episodes. Fall precautions have been implemented appropriately such as yellow- treaded non-slip socks which were ordered from supply in 'large' size, pt's bed is in the lowest position, bed alarm is on, and call light within reach.      "

## 2017-11-13 LAB
BACTERIA BLD CULT: NORMAL
BACTERIA BLD CULT: NORMAL
SIGNIFICANT IND 70042: NORMAL
SIGNIFICANT IND 70042: NORMAL
SITE SITE: NORMAL
SITE SITE: NORMAL
SOURCE SOURCE: NORMAL
SOURCE SOURCE: NORMAL

## 2017-12-26 ENCOUNTER — NON-PROVIDER VISIT (OUTPATIENT)
Dept: CARDIOLOGY | Facility: MEDICAL CENTER | Age: 82
End: 2017-12-26
Payer: COMMERCIAL

## 2017-12-26 DIAGNOSIS — Z95.0 CARDIAC PACEMAKER IN SITU: ICD-10-CM

## 2017-12-26 DIAGNOSIS — I49.5 SICK SINUS SYNDROME (HCC): ICD-10-CM

## 2017-12-26 PROCEDURE — 93279 PRGRMG DEV EVAL PM/LDLS PM: CPT | Performed by: INTERNAL MEDICINE

## 2020-07-15 NOTE — THERAPY
"Occupational Therapy Evaluation completed.   Functional Status:  OT eval completed on 89 YO M with syncope episodes. PMHx of dementia and atrial fibrillation. Son present for tx session. Pt required mod A/CGA for functional transfers and ADLs. Pt is functioning below PLOF due to decreased activity tolerance and weakness. Pt demonstrated increased impulsivity and decreased safety awareness during ADLs. Pt would benefit from continued skilled acute OT services in order to increase independence in ADLs/functional transfers and safety awareness.  Plan of Care: Will benefit from Occupational Therapy 2 times per week  Discharge Recommendations:  Equipment: Will Continue to Assess for Equipment Needs. Post-acute therapy Discharge to home with outpatient or home health for additional skilled therapy services.    See \"Rehab Therapy-Acute\" Patient Summary Report for complete documentation.    " Done.Sr